# Patient Record
Sex: MALE | Race: WHITE | NOT HISPANIC OR LATINO | Employment: FULL TIME | URBAN - METROPOLITAN AREA
[De-identification: names, ages, dates, MRNs, and addresses within clinical notes are randomized per-mention and may not be internally consistent; named-entity substitution may affect disease eponyms.]

---

## 2017-01-04 ENCOUNTER — RADIANT APPOINTMENT (OUTPATIENT)
Dept: GENERAL RADIOLOGY | Facility: CLINIC | Age: 47
End: 2017-01-04
Attending: FAMILY MEDICINE
Payer: COMMERCIAL

## 2017-01-04 ENCOUNTER — OFFICE VISIT (OUTPATIENT)
Dept: FAMILY MEDICINE | Facility: CLINIC | Age: 47
End: 2017-01-04
Payer: COMMERCIAL

## 2017-01-04 VITALS
HEIGHT: 67 IN | TEMPERATURE: 96.8 F | BODY MASS INDEX: 27.31 KG/M2 | HEART RATE: 76 BPM | DIASTOLIC BLOOD PRESSURE: 72 MMHG | WEIGHT: 174 LBS | SYSTOLIC BLOOD PRESSURE: 98 MMHG

## 2017-01-04 DIAGNOSIS — M25.531 RIGHT WRIST PAIN: Primary | ICD-10-CM

## 2017-01-04 DIAGNOSIS — S63.501A RIGHT WRIST SPRAIN, INITIAL ENCOUNTER: ICD-10-CM

## 2017-01-04 DIAGNOSIS — M25.531 RIGHT WRIST PAIN: ICD-10-CM

## 2017-01-04 PROCEDURE — 99213 OFFICE O/P EST LOW 20 MIN: CPT | Performed by: FAMILY MEDICINE

## 2017-01-04 PROCEDURE — 73110 X-RAY EXAM OF WRIST: CPT | Mod: RT

## 2017-01-04 NOTE — NURSING NOTE
"Chief Complaint   Patient presents with     Wrist Pain     Fell on the ice 1/2/17 and hurt right wrist.      Initial BP 98/72 mmHg  Pulse 76  Temp(Src) 96.8  F (36  C) (Tympanic)  Ht 5' 7\" (1.702 m)  Wt 174 lb (78.926 kg)  BMI 27.25 kg/m2 Estimated body mass index is 27.25 kg/(m^2) as calculated from the following:    Height as of this encounter: 5' 7\" (1.702 m).    Weight as of this encounter: 174 lb (78.926 kg).  BP completed using cuff size: regular - right arm.  Mary Alcocer CMA  "

## 2017-01-04 NOTE — PROGRESS NOTES
"SUBJECTIVE:                                                    Jon Rowley is a 46 year old male who presents to clinic today for the following health issues:    Chief Complaint   Patient presents with     Wrist Pain     Fell on the ice 1/2/17 and hurt right wrist.      Joint Pain     Onset: 2 days ago     Description:   Location: right wrist  Character: Sharp, Dull ache, Stabbing and Burning. Decreased range of motion    Intensity: mild, severe    Progression of Symptoms: same    Accompanying Signs & Symptoms:  Other symptoms: none   History:   Previous similar pain: no       Precipitating factors:   Trauma or overuse: YES    Alleviating factors:  Improved by: nothing       Therapies Tried and outcome: ice, advil      Problem list and histories reviewed & adjusted, as indicated.  Additional history: as above   Out ice fishing slipped so fast does not remember how he hit it   Hurts dorso lateral.   Was able to move it has not gotten a lot better     Patient Active Problem List   Diagnosis     CARDIOVASCULAR SCREENING; LDL GOAL LESS THAN 160     Past Surgical History   Procedure Laterality Date     None       Colonoscopy  3/2/2011     COLONOSCOPY performed by ADRIANNE ROMAN at [facility]       Social History   Substance Use Topics     Smoking status: Never Smoker      Smokeless tobacco: Never Used     Alcohol Use: 0.0 oz/week     0 Standard drinks or equivalent per week      Comment: Occasional     Family History   Problem Relation Age of Onset     Cancer - colorectal Father      Circulatory Father      Mini strokes     Respiratory Maternal Grandmother      Emphysema     HEART DISEASE Maternal Grandfather      Heart attack           ROS:  Constitutional, HEENT, cardiovascular, pulmonary, gi and gu systems are negative, except as otherwise noted.    OBJECTIVE:                                                    BP 98/72 mmHg  Pulse 76  Temp(Src) 96.8  F (36  C) (Tympanic)  Ht 5' 7\" (1.702 m)  Wt 174 lb (78.926 kg) "  BMI 27.25 kg/m2 Body mass index is 27.25 kg/(m^2).   GENERAL: healthy, alert and no distress  MS: peripheral pulses normal  Wrist Exam: WRIST:  Inspection: swelling dorsally   no effusion  Palpation: Tender: Tenderness:, back of wrist   Non-tender: distal radius, distal ulna, scaphoid, lunate, triquetrum, hook of hamate, carpals, snuff box  Range of Motion: flexion:  decreased, painful, painful, extension:  decreased, painful, painful, Pronation/Supination: decreasedand painful for both   Strength: no deficits but painful   Special tests: radial, ulnar and median nerve function intact    ELBOW:  elbow exam : Inspection: no swelling  Tender: none  Non-tender: all  Range of Motion: all normal  Strength: elbow strength full  Special tests: normal stability:       NEURO: strength and tone- normal, sensory exam- grossly normal, mentation- intact, speech- normal,       xrays reviewed no fracture or dislocation by my read and reviewed with patient    ASSESSMENT/PLAN:                                                    (M25.53) Right wrist pain  (primary encounter diagnosis)  Comment:   Plan: XR Wrist Right G/E 3 Views            (S63.161A) Right wrist sprain, initial encounter  Comment:   Plan: patient is a  and requires paper work for [employer] expalining his injury. This was filled out and will be scanned in o the record.  Beverly el        reports that he has never smoked. He has never used smokeless tobacco.    Patient instructed to return to the office in 3 weeks  for reevaluation unless improving. Signs and symptoms of worsening are reviewed with the patient. If symptoms acutely change and condition worsens patient is instructed to seek medical evaluation through the office or urgent care department or if during non business hours through the emergency department.        Lisa Recinos M.D.    [facility]

## 2017-01-04 NOTE — PATIENT INSTRUCTIONS
3m futuro splint   Let me know if things are getting worse instead of better                   Wrist Sprain            What is a wrist sprain?   A sprain is an injury to a joint that causes a stretch or tear in a ligament. Ligaments are strong bands of tissue that connect one bone to another. Your wrist is made up of 8 bones that are attached to your hand bones and the bones of your forearm. The wrist joint is covered by a joint capsule and the bones are connected by ligaments.   How does it occur?   A wrist sprain can happen when you fall on your wrist or hand, when you are struck by an object, or during a forced motion of the wrist.   What are the symptoms?   You have pain, swelling, and tenderness in your wrist.   How is it diagnosed?   Your healthcare provider will review your symptoms and examine your wrist. You may have an X-ray to be sure you have not broken any bones in your wrist.   How is it treated?   To treat this condition:   Put an ice pack, gel pack, or package of frozen vegetables, wrapped in a cloth on the wrist every 3 to 4 hours, for up to 20 minutes at a time.   Raise your wrist on a pillow when you sit or lie down.   Take an anti-inflammatory such as ibuprofen, or other medicine as directed by your provider. Nonsteroidal anti-inflammatory medicines (NSAIDs) may cause stomach bleeding and other problems. These risks increase with age. Read the label and take as directed. Unless recommended by your healthcare provider, do not take for more than 10 days.   Wear a splint or cast on your wrist as directed by your provider.   Follow your provider's instructions for doing exercises to help you recover.   Some serious wrist sprains that involve ligament tears may need surgery.   While you are recovering from your injury you will need to change your sport or activity to one that does not make your condition worse. For example, you may need to run instead of playing basketball.   How long will the effects  last?   The length of recovery depends on many factors such as your age and health, and if you have had a previous wrist injury. Recovery time also depends on the severity of the wrist sprain. Pain from a wrist sprain may last several weeks or longer. You need to stop doing the activities that cause pain until your wrist has improved. If you continue doing activities that cause pain, your symptoms will return and it will take longer to recover.   When can I return to my normal activities?   Everyone recovers from an injury at a different rate. Return to your activities depends on how soon your wrist recovers, not by how many days or weeks it has been since your injury has occurred. In general, the longer you have symptoms before you start treatment, the longer it will take to get better. The goal of rehabilitation is to return you to your normal activities as soon as is safely possible. If you return too soon you may worsen your injury.   You may return to your activities when the injured wrist can move normally without pain. Your injured wrist, hand, and forearm need to have the same strength as the uninjured side.   How can I prevent a wrist sprain?   A wrist sprain usually occurs during an accident that is not preventable. However, when you are doing activities such as rollerblading be sure to wear protective wrist guards.          Wrist Sprain Rehabilitation Exercises              You may do the stretching exercises when the sharp wrist pain goes away. You may do the strengthening exercises when stretching is nearly painless.   Stretching exercises   Wrist Range of Motion   0. Flexion: Gently bend your wrist forward. Hold for 5 seconds. Do 3 sets of 10.   0. Extension: Gently bend your wrist backward. Hold this position 5 seconds. Do 3 sets of 10.   0. Side to side: Gently move your wrist from side to side (a handshake motion). Hold for 5 seconds in each direction. Do 3 sets of 10.   Wrist stretch: Press the  back of the hand on your injured side with your other hand to help bend your wrist. Hold for 15 to 30 seconds. Next, stretch the hand back by pressing the fingers in a backward direction. Hold for 15 to 30 seconds. Keep the arm on your injured side straight during this exercise. Do 3 sets.   Wrist extension stretch: Stand at a table with your palms down, fingers flat, and elbows straight. Lean your body weight forward. Hold this position for 15 seconds. Repeat 3 times.   Wrist flexion stretch: Stand with the back of your hands on a table, palms facing up, fingers pointing toward your body, and elbows straight. Lean away from the table. Hold this position for 15 to 30 seconds. Repeat 3 times.   Forearm pronation and supination: Bend the elbow of your injured arm 90 degrees, keeping your elbow at your side. Turn your palm up and hold for 5 seconds. Then slowly turn your palm down and hold for 5 seconds. Make sure you keep your elbow at your side and bent 90 degrees while you do the exercise. Do 3 sets of 10. When this exercise becomes pain free, do it with some weight in your hand such   as a soup can or hammer handle.   Strengthening exercises   Wrist flexion: Hold a can or hammer handle in your hand with your palm facing up. Bend your wrist upward. Slowly lower the weight and return to the starting position. Do 3 sets of 10. Gradually increase the weight of the can or weight you are holding.   Wrist extension: Hold a soup can or hammer handle in your hand with your palm facing down. Slowly bend your wrist up. Slowly lower the weight down into the starting position. Do 3 sets of 10. Gradually increase the weight of the object you are holding.    strengthening: Squeeze a soft rubber ball and hold the squeeze for 5 seconds. Do 3 sets of 10.     Published by [employer].  This content is reviewed periodically and is subject to change as new health information becomes available. The information is intended to inform  and educate and is not a replacement for medical evaluation, advice, diagnosis or treatment by a healthcare professional.   Written by Aure Hussein, MS, PT, and Mckayla Leong PT, [facility], \[facility] [place] [facility]\"", for [employer].     ? 2010 [employer] and/or its affiliates. All Rights Reserved.   Copyright   [employer] 2011  Adult Health Advisor

## 2017-01-04 NOTE — MR AVS SNAPSHOT
After Visit Summary   1/4/2017    Jon Rowley    MRN: [id number]           Patient Information     Date Of Birth          1970        Visit Information        Provider Department      1/4/2017 9:00 AM Lisa Recinos MD [facility]        Today's Diagnoses     Right wrist pain    -  1       Care Instructions    3m futuro splint   Let me know if things are getting worse instead of better                   Wrist Sprain            What is a wrist sprain?   A sprain is an injury to a joint that causes a stretch or tear in a ligament. Ligaments are strong bands of tissue that connect one bone to another. Your wrist is made up of 8 bones that are attached to your hand bones and the bones of your forearm. The wrist joint is covered by a joint capsule and the bones are connected by ligaments.   How does it occur?   A wrist sprain can happen when you fall on your wrist or hand, when you are struck by an object, or during a forced motion of the wrist.   What are the symptoms?   You have pain, swelling, and tenderness in your wrist.   How is it diagnosed?   Your healthcare provider will review your symptoms and examine your wrist. You may have an X-ray to be sure you have not broken any bones in your wrist.   How is it treated?   To treat this condition:   Put an ice pack, gel pack, or package of frozen vegetables, wrapped in a cloth on the wrist every 3 to 4 hours, for up to 20 minutes at a time.   Raise your wrist on a pillow when you sit or lie down.   Take an anti-inflammatory such as ibuprofen, or other medicine as directed by your provider. Nonsteroidal anti-inflammatory medicines (NSAIDs) may cause stomach bleeding and other problems. These risks increase with age. Read the label and take as directed. Unless recommended by your healthcare provider, do not take for more than 10 days.   Wear a splint or cast on your wrist as directed by your provider.   Follow your provider's instructions  for doing exercises to help you recover.   Some serious wrist sprains that involve ligament tears may need surgery.   While you are recovering from your injury you will need to change your sport or activity to one that does not make your condition worse. For example, you may need to run instead of playing basketball.   How long will the effects last?   The length of recovery depends on many factors such as your age and health, and if you have had a previous wrist injury. Recovery time also depends on the severity of the wrist sprain. Pain from a wrist sprain may last several weeks or longer. You need to stop doing the activities that cause pain until your wrist has improved. If you continue doing activities that cause pain, your symptoms will return and it will take longer to recover.   When can I return to my normal activities?   Everyone recovers from an injury at a different rate. Return to your activities depends on how soon your wrist recovers, not by how many days or weeks it has been since your injury has occurred. In general, the longer you have symptoms before you start treatment, the longer it will take to get better. The goal of rehabilitation is to return you to your normal activities as soon as is safely possible. If you return too soon you may worsen your injury.   You may return to your activities when the injured wrist can move normally without pain. Your injured wrist, hand, and forearm need to have the same strength as the uninjured side.   How can I prevent a wrist sprain?   A wrist sprain usually occurs during an accident that is not preventable. However, when you are doing activities such as rollerblading be sure to wear protective wrist guards.          Wrist Sprain Rehabilitation Exercises              You may do the stretching exercises when the sharp wrist pain goes away. You may do the strengthening exercises when stretching is nearly painless.   Stretching exercises   Wrist Range of Motion    0. Flexion: Gently bend your wrist forward. Hold for 5 seconds. Do 3 sets of 10.   0. Extension: Gently bend your wrist backward. Hold this position 5 seconds. Do 3 sets of 10.   0. Side to side: Gently move your wrist from side to side (a handshake motion). Hold for 5 seconds in each direction. Do 3 sets of 10.   Wrist stretch: Press the back of the hand on your injured side with your other hand to help bend your wrist. Hold for 15 to 30 seconds. Next, stretch the hand back by pressing the fingers in a backward direction. Hold for 15 to 30 seconds. Keep the arm on your injured side straight during this exercise. Do 3 sets.   Wrist extension stretch: Stand at a table with your palms down, fingers flat, and elbows straight. Lean your body weight forward. Hold this position for 15 seconds. Repeat 3 times.   Wrist flexion stretch: Stand with the back of your hands on a table, palms facing up, fingers pointing toward your body, and elbows straight. Lean away from the table. Hold this position for 15 to 30 seconds. Repeat 3 times.   Forearm pronation and supination: Bend the elbow of your injured arm 90 degrees, keeping your elbow at your side. Turn your palm up and hold for 5 seconds. Then slowly turn your palm down and hold for 5 seconds. Make sure you keep your elbow at your side and bent 90 degrees while you do the exercise. Do 3 sets of 10. When this exercise becomes pain free, do it with some weight in your hand such   as a soup can or hammer handle.   Strengthening exercises   Wrist flexion: Hold a can or hammer handle in your hand with your palm facing up. Bend your wrist upward. Slowly lower the weight and return to the starting position. Do 3 sets of 10. Gradually increase the weight of the can or weight you are holding.   Wrist extension: Hold a soup can or hammer handle in your hand with your palm facing down. Slowly bend your wrist up. Slowly lower the weight down into the starting position. Do 3 sets of  "10. Gradually increase the weight of the object you are holding.    strengthening: Squeeze a soft rubber ball and hold the squeeze for 5 seconds. Do 3 sets of 10.     Published by [employer].  This content is reviewed periodically and is subject to change as new health information becomes available. The information is intended to inform and educate and is not a replacement for medical evaluation, advice, diagnosis or treatment by a healthcare professional.   Written by Aure Hussein, MS, PT, and Mckayla Leong PT, [facility], [facility], for [employer].     ? 2010 [facility] and/or its affiliates. All Rights Reserved.   Copyright   [employer] 2011  Adult Health Advisor                    Follow-ups after your visit        Who to contact     Normal or non-critical lab and imaging results will be communicated to you by [employer], letter or phone within 4 business days after the clinic has received the results. If you do not hear from us within 7 days, please contact the clinic through [employer] or phone. If you have a critical or abnormal lab result, we will notify you by phone as soon as possible.  Submit refill requests through [employer] or call your pharmacy and they will forward the refill request to us. Please allow 3 business days for your refill to be completed.          If you need to speak with a  for additional information , please call: [phone]             Additional Information About Your Visit        [employer] Information     [employer] lets you send messages to your doctor, view your test results, renew your prescriptions, schedule appointments and more. To sign up, go to [email or web address] . Click on \"Log in\" on the left side of the screen, which will take you to the Welcome page. Then click on \"Sign up Now\" on the right side of the page.     You will be asked to enter the access code listed below, as well as some personal information. Please follow the directions to create " "your username and password.     Your access code is: [email or web address]  Expires: 3/22/2017 11:18 AM     Your access code will  in 90 days. If you need help or a new code, please call your [facility] or [phone].        Care EveryWhere ID     This is your Care EveryWhere ID. This could be used by other organizations to access your [place] medical records  [id number]        Your Vitals Were     Pulse Temperature Height BMI (Body Mass Index)          76 96.8  F (36  C) (Tympanic) 5' 7\" (1.702 m) 27.25 kg/m2         Blood Pressure from Last 3 Encounters:   17 98/72   16 110/84   16 132/80    Weight from Last 3 Encounters:   17 174 lb (78.926 kg)   16 174 lb 6.4 oz (79.107 kg)   16 169 lb 12.8 oz (77.021 kg)               Primary Care Provider Office Phone #    [facility] [phone]       No address on file        Thank you!     Thank you for choosing [facility]  for your care. Our goal is always to provide you with excellent care. Hearing back from our patients is one way we can continue to improve our services. Please take a few minutes to complete the written survey that you may receive in the mail after your visit with us. Thank you!             Your Updated Medication List - Protect others around you: Learn how to safely use, store and throw away your medicines at www.disposemymeds.org.          This list is accurate as of: 17  9:57 AM.  Always use your most recent med list.                   Brand Name Dispense Instructions for use    Multi-vitamin Tabs tablet      Take 1 tablet by mouth daily       NO ACTIVE MEDICATIONS            "

## 2017-05-14 ENCOUNTER — ANESTHESIA EVENT (OUTPATIENT)
Dept: GASTROENTEROLOGY | Facility: CLINIC | Age: 47
End: 2017-05-14
Payer: COMMERCIAL

## 2017-05-14 NOTE — ANESTHESIA PREPROCEDURE EVALUATION
Anesthesia Evaluation     . Pt has had prior anesthetic. Type: General           ROS/MED HX    ENT/Pulmonary: Comment: History of bronchitis      Neurologic:  - neg neurologic ROS     Cardiovascular:  - neg cardiovascular ROS       METS/Exercise Tolerance:  >4 METS   Hematologic:  - neg hematologic  ROS       Musculoskeletal:  - neg musculoskeletal ROS       GI/Hepatic:  - neg GI/hepatic ROS      (-) liver disease   Renal/Genitourinary:  - ROS Renal section negative       Endo:  - neg endo ROS       Psychiatric:  - neg psychiatric ROS       Infectious Disease:  - neg infectious disease ROS       Malignancy:      - no malignancy   Other:    - neg other ROS                 Physical Exam  Normal systems: cardiovascular, pulmonary and dental    Airway   Mallampati: II    Dental     Cardiovascular       Pulmonary                     Anesthesia Plan      History & Physical Review  History and physical reviewed and following examination; no interval change.    ASA Status:  2 .        Plan for MAC Reason for MAC:  Deep or markedly invasive procedure (G8)         Postoperative Care      Consents  Anesthetic plan, risks, benefits and alternatives discussed with:  Patient..                          .

## 2017-05-16 ENCOUNTER — SURGERY (OUTPATIENT)
Age: 47
End: 2017-05-16

## 2017-05-16 ENCOUNTER — ANESTHESIA (OUTPATIENT)
Dept: GASTROENTEROLOGY | Facility: CLINIC | Age: 47
End: 2017-05-16
Payer: COMMERCIAL

## 2017-05-16 ENCOUNTER — HOSPITAL ENCOUNTER (OUTPATIENT)
Facility: CLINIC | Age: 47
Discharge: HOME | End: 2017-05-16
Attending: SURGERY | Admitting: SURGERY
Payer: COMMERCIAL

## 2017-05-16 VITALS
HEIGHT: 67 IN | SYSTOLIC BLOOD PRESSURE: 115 MMHG | RESPIRATION RATE: 16 BRPM | DIASTOLIC BLOOD PRESSURE: 82 MMHG | OXYGEN SATURATION: 98 % | BODY MASS INDEX: 26.68 KG/M2 | TEMPERATURE: 98.4 F | WEIGHT: 170 LBS

## 2017-05-16 LAB — COLONOSCOPY: NORMAL

## 2017-05-16 PROCEDURE — 25000125 ZZHC RX 250: Performed by: NURSE ANESTHETIST, CERTIFIED REGISTERED

## 2017-05-16 PROCEDURE — 25000125 ZZHC RX 250: Performed by: SURGERY

## 2017-05-16 PROCEDURE — G0105 COLORECTAL SCRN; HI RISK IND: HCPCS | Performed by: SURGERY

## 2017-05-16 PROCEDURE — 37000008 ZZH ANESTHESIA TECHNICAL FEE, 1ST 30 MIN: Performed by: SURGERY

## 2017-05-16 PROCEDURE — 45378 DIAGNOSTIC COLONOSCOPY: CPT | Performed by: SURGERY

## 2017-05-16 PROCEDURE — 25000128 H RX IP 250 OP 636: Performed by: SURGERY

## 2017-05-16 RX ORDER — GLYCOPYRROLATE 0.2 MG/ML
INJECTION, SOLUTION INTRAMUSCULAR; INTRAVENOUS PRN
Status: DISCONTINUED | OUTPATIENT
Start: 2017-05-16 | End: 2017-05-16

## 2017-05-16 RX ORDER — LIDOCAINE 40 MG/G
CREAM TOPICAL
Status: DISCONTINUED | OUTPATIENT
Start: 2017-05-16 | End: 2017-05-16 | Stop reason: HOSPADM

## 2017-05-16 RX ORDER — ONDANSETRON 2 MG/ML
4 INJECTION INTRAMUSCULAR; INTRAVENOUS
Status: DISCONTINUED | OUTPATIENT
Start: 2017-05-16 | End: 2017-05-16 | Stop reason: HOSPADM

## 2017-05-16 RX ORDER — SODIUM CHLORIDE, SODIUM LACTATE, POTASSIUM CHLORIDE, CALCIUM CHLORIDE 600; 310; 30; 20 MG/100ML; MG/100ML; MG/100ML; MG/100ML
INJECTION, SOLUTION INTRAVENOUS CONTINUOUS
Status: DISCONTINUED | OUTPATIENT
Start: 2017-05-16 | End: 2017-05-16 | Stop reason: HOSPADM

## 2017-05-16 RX ORDER — PROPOFOL 10 MG/ML
INJECTION, EMULSION INTRAVENOUS PRN
Status: DISCONTINUED | OUTPATIENT
Start: 2017-05-16 | End: 2017-05-16

## 2017-05-16 RX ORDER — PROPOFOL 10 MG/ML
INJECTION, EMULSION INTRAVENOUS CONTINUOUS PRN
Status: DISCONTINUED | OUTPATIENT
Start: 2017-05-16 | End: 2017-05-16

## 2017-05-16 RX ADMIN — GLYCOPYRROLATE 0.2 MG: 0.2 INJECTION, SOLUTION INTRAMUSCULAR; INTRAVENOUS at 10:43

## 2017-05-16 RX ADMIN — PROPOFOL 30 MG: 10 INJECTION, EMULSION INTRAVENOUS at 10:43

## 2017-05-16 RX ADMIN — PROPOFOL 200 MCG/KG/MIN: 10 INJECTION, EMULSION INTRAVENOUS at 10:40

## 2017-05-16 RX ADMIN — PROPOFOL 30 MG: 10 INJECTION, EMULSION INTRAVENOUS at 10:45

## 2017-05-16 RX ADMIN — PROPOFOL 30 MG: 10 INJECTION, EMULSION INTRAVENOUS at 10:42

## 2017-05-16 RX ADMIN — SODIUM CHLORIDE, POTASSIUM CHLORIDE, SODIUM LACTATE AND CALCIUM CHLORIDE: 600; 310; 30; 20 INJECTION, SOLUTION INTRAVENOUS at 09:45

## 2017-05-16 RX ADMIN — LIDOCAINE HYDROCHLORIDE 1 ML: 10 INJECTION, SOLUTION INFILTRATION; PERINEURAL at 09:45

## 2017-05-16 RX ADMIN — PROPOFOL 30 MG: 10 INJECTION, EMULSION INTRAVENOUS at 10:41

## 2017-05-16 RX ADMIN — PROPOFOL 30 MG: 10 INJECTION, EMULSION INTRAVENOUS at 10:44

## 2017-05-16 NOTE — BRIEF OP NOTE
[facility]   Brief Operative Note    Pre-operative diagnosis: screening   Post-operative diagnosis normal colon   Procedure: Procedure(s):  Colonoscopy - Wound Class: II-Clean Contaminated   Surgeon(s): Surgeon(s) and Role:     * Edinson Torres MD - Primary   Estimated blood loss: * No values recorded between 5/16/2017 12:00 AM and 5/16/2017 10:58 AM *    Specimens: * No specimens in log *   Findings: Normal colon

## 2017-05-16 NOTE — ANESTHESIA POSTPROCEDURE EVALUATION
Patient: Jon Rowley    Procedure(s):  Colonoscopy - Wound Class: II-Clean Contaminated    Diagnosis:screening  Diagnosis Additional Information: No value filed.    Anesthesia Type:  MAC    Note:  Anesthesia Post Evaluation    Patient location during evaluation: Phase 2  Patient participation: Able to fully participate in evaluation  Level of consciousness: awake  Pain management: adequate  Airway patency: patent  Cardiovascular status: acceptable  Respiratory status: acceptable  Hydration status: acceptable  PONV: none     Anesthetic complications: None          Last vitals:  Vitals:    05/16/17 0920   BP: 113/85   Resp: 18   Temp: 36.9  C (98.4  F)   SpO2: 98%         Electronically Signed By: Marvin Anderson CRNA, APRN CRNA  May 16, 2017  11:04 AM

## 2017-05-16 NOTE — ANESTHESIA CARE TRANSFER NOTE
Patient: Jon Rowley    Procedure(s):  Colonoscopy - Wound Class: II-Clean Contaminated    Diagnosis: screening  Diagnosis Additional Information: No value filed.    Anesthesia Type:   MAC     Note:    Patient transferred to:Phase II        Vitals: (Last set prior to Anesthesia Care Transfer)    CRNA VITALS  5/16/2017 1031 - 5/16/2017 1104      5/16/2017             Pulse: 80    Ht Rate: 78    SpO2: 99 %    EKG: NSR                Electronically Signed By: Marvin Anderson CRNA, APRN CRNA  May 16, 2017  11:04 AM

## 2017-05-16 NOTE — H&P
46 year old year old male here for colonoscopy for screening.    Patient Active Problem List   Diagnosis     CARDIOVASCULAR SCREENING; LDL GOAL LESS THAN 160       No past medical history on file.    Past Surgical History:   Procedure Laterality Date     COLONOSCOPY  3/2/2011    COLONOSCOPY performed by AD[facility] ROMAN at [facility]     none         [facility]    No current outpatient prescriptions on file.       No Known Allergies    Pt reports that he has never smoked. He has never used smokeless tobacco. He reports that he drinks alcohol. He reports that he does not use illicit drugs.    Exam:  There were no vitals taken for this visit.    Awake, Alert OX3  Lungs - CTA bilaterally  CV - RRR, no murmurs, distal pulses intact  Abd - soft, non-distended, non-tender, +BS  Extr - No cyanosis or edema    A/P 46 year old year old male in need of colonoscopy for screening. Risks, benefits, alternatives, and complications were discussed including the possibility of perforation and the patient agreed to proceed    Edinson Torres MD

## 2018-02-02 ENCOUNTER — TELEPHONE (OUTPATIENT)
Dept: LAB | Facility: CLINIC | Age: 48
End: 2018-02-02

## 2018-05-18 ENCOUNTER — OFFICE VISIT (OUTPATIENT)
Dept: LAB | Facility: SCHOOL | Age: 48
End: 2018-05-18
Payer: COMMERCIAL

## 2018-05-18 VITALS
OXYGEN SATURATION: 97 % | HEART RATE: 74 BPM | TEMPERATURE: 97.6 F | SYSTOLIC BLOOD PRESSURE: 126 MMHG | RESPIRATION RATE: 16 BRPM | WEIGHT: 178.2 LBS | DIASTOLIC BLOOD PRESSURE: 86 MMHG | HEIGHT: 67 IN | BODY MASS INDEX: 27.97 KG/M2

## 2018-05-18 DIAGNOSIS — Z00.00 WELLNESS EXAMINATION: Primary | ICD-10-CM

## 2018-05-18 PROCEDURE — 99213 OFFICE O/P EST LOW 20 MIN: CPT | Performed by: NURSE PRACTITIONER

## 2018-05-18 NOTE — PROGRESS NOTES
"  SUBJECTIVE:  CC: Jon Rowley is an 47 year old man who presents for Wellness Check at [facility].     Review of Healthy Lifestyle:    Do you get at least three servings of calcium containing foods daily (dairy, green leafy vegetables, etc.)? yes     Do you have a high-fiber diet? no     Amount of exercise or daily activities, outside of work: 3-4 day(s) per week for 60 min    Do you wear sunscreen on a regular basis? Yes      Are you taking your medications regularly not applicable    Have you had an eye exam in the past two years? yes    Do you see a dentist twice per year? yes    Do you have sleep apnea, excessive snoring or excessive daytime drowsiness? no    Do you use tobacco in any form? no       OBJECTIVE:    Vitals: /86 (BP Location: Right arm, Patient Position: Sitting, Cuff Size: Adult Large)  Pulse 74  Temp 97.6  F (36.4  C) (Tympanic)  Resp 16  Ht 5' 7\" (1.702 m)  Wt 178 lb 3.2 oz (80.8 kg)  SpO2 97%  BMI 27.91 kg/m2  BMI= Body mass index is 27.91 kg/(m^2).    HEARING: :  Testing not done; patient declined    VISION:  Testing not done; just had eye exam    Medication Reconciliation: complete    ASSESSMENT/PLAN: Counseled on healthy diet and exercise guidelines. Encouraged to have fasting physical with PCP.    COUNSELING:      reports that he has never smoked. He has quit using smokeless tobacco.    Estimated body mass index is 27.91 kg/(m^2) as calculated from the following:    Height as of this encounter: 5' 7\" (1.702 m).    Weight as of this encounter: 178 lb 3.2 oz (80.8 kg).   Weight management plan: Discussed healthy diet and exercise guidelines and patient will follow up in 12 months in clinic to re-evaluate.    Counseling Resources  [place]'s MyPlate  https://www.quitplan.com/    TOMMY Leigh [facility] SCHOOL PROVIDER  [facility]     "

## 2018-05-18 NOTE — MR AVS SNAPSHOT
After Visit Summary   5/18/2018    Jon Rowley    MRN: [id number]           Patient Information     Date Of Birth          1970        Visit Information        Provider Department      5/18/2018 10:30 AM Provider, [facility]        Care Instructions                    Jon Rowley has completed a Wellness Check at the [facility] on 5/18/2018.      ____________________________________________  [facility] PROVIDER                                                                               Wellness Visit Recommendations:      See your regular primary care health provider every year in order to help stay healthy.    Review health changes.     Review your medicines if your doctor has prescribed any.    Talk to your provider about how often to have your cholesterol checked.    If you are at risk for diabetes, you should have a diabetes test (fasting glucose).    Shots: Get a flu shot each year. Get a tetanus shot every 10 years.     Review with your primary care provider other immunizations that you may need based on your age and/or medical/surgical history    Nutrition:     Eat at least 5 servings of fruits and vegetables each day.    Eat whole-grain bread, whole-wheat pasta and brown rice instead of white grains and rice.    You can visit: http://www.mayoclinic.org/healthy-lifestyle/nutrition-and-healthy-eating/in-depth/mediterranean-diet/art-77636412  For some information on a healthy diet.       Preventive Care to be reviewed by your primary care provider:    Females:        Cervical Cancer Screening                          Breast Cancer Screening                          Colon Cancer Screening  Males:             Prostrate Cancer Screening                          Colon Cancer Screening      Lifestyle:    Exercise at least 150 minutes a week (30 minutes a day, 5 days of the week). This will help you control your weight and help prevent  "disease or manage disease.    Limit alcohol to one drink per day or less depending on your past medical history.    No smoking.     Wear sunscreen to prevent skin cancer.    See your dentist every six months for an exam and cleaning.    Today's Vital Signs:  /86 (BP Location: Right arm, Patient Position: Sitting, Cuff Size: Adult Large)  Pulse 74  Temp 97.6  F (36.4  C) (Tympanic)  Resp 16  Ht 5' 7\" (1.702 m)  Wt 178 lb 3.2 oz (80.8 kg)  SpO2 97%  BMI 27.91 kg/m2          Follow-ups after your visit        Who to contact     If you have questions or need follow up information about today's clinic visit or your schedule please contact [place] [address] directly at [phone].  Normal or non-critical lab and imaging results will be communicated to you by Qikhart, letter or phone within 4 business days after the clinic has received the results. If you do not hear from us within 7 days, please contact the clinic through Qikhart or phone. If you have a critical or abnormal lab result, we will notify you by phone as soon as possible.  Submit refill requests through [employer] or call your pharmacy and they will forward the refill request to us. Please allow 3 business days for your refill to be completed.          Additional Information About Your Visit        QikharCarbonFlow Information     [employer] lets you send messages to your doctor, view your test results, renew your prescriptions, schedule appointments and more. To sign up, go to [email or web address] . Click on \"Log in\" on the left side of the screen, which will take you to the Welcome page. Then click on \"Sign up Now\" on the right side of the page.     You will be asked to enter the access code listed below, as well as some personal information. Please follow the directions to create your username and password.     Your access code is: [email or web address]  Expires: 2018 10:33 AM     Your access code will  in 90 days. If you need help " "or a new code, please call your [facility] clinic or [phone].        Care EveryWhere ID     This is your Care EveryWhere ID. This could be used by other organizations to access your [facility] medical records  [id number]        Your Vitals Were     Pulse Temperature Respirations Height Pulse Oximetry BMI (Body Mass Index)    74 97.6  F (36.4  C) (Tympanic) 16 5' 7\" (1.702 m) 97% 27.91 kg/m2       Blood Pressure from Last 3 Encounters:   05/18/18 126/86   05/16/17 115/82   01/04/17 98/72    Weight from Last 3 Encounters:   05/18/18 178 lb 3.2 oz (80.8 kg)   05/16/17 170 lb (77.1 kg)   01/04/17 174 lb (78.9 kg)              Today, you had the following     No orders found for display       Primary Care Provider Office Phone # Fax #    Maria C Lauri Cortes [phone] [address] [address] [facility] [address]        Equal Access to Services     [facility]: Hadii aad ku hadasho Soomaali, waaxda luqadaha, qaybta kaalmada adeegyada, waxmirna [place] hayoumou abarca . So [facility] [phone].    ATENCIÓN: Si habla español, tiene a sow disposición servicios gratuitos de asistencia lingüística. Llame al [phone].    We comply with applicable federal civil rights laws and [place] laws. We do not discriminate on the basis of race, color, national origin, age, disability, sex, sexual orientation, or gender identity.            Thank you!     Thank you for choosing [place] [address]  for your care. Our goal is always to provide you with excellent care. Hearing back from our patients is one way we can continue to improve our services. Please take a few minutes to complete the written survey that you may receive in the mail after your visit with us. Thank you!             Your Updated Medication List - Protect others around you: Learn how to safely use, store and throw away your medicines at www.disposemymeds.org.          This list is accurate as of 5/18/18 10:33 AM.  Always " use your most recent med list.                   Brand Name Dispense Instructions for use Diagnosis    Multi-vitamin Tabs tablet      Take 1 tablet by mouth daily        NO ACTIVE MEDICATIONS

## 2018-05-18 NOTE — PATIENT INSTRUCTIONS
Jon Rowley has completed a Wellness Check at the [facility] on 5/18/2018.      ____________________________________________  [place] SCHOOL PROVIDER                                                                               Wellness Visit Recommendations:      See your regular primary care health provider every year in order to help stay healthy.    Review health changes.     Review your medicines if your doctor has prescribed any.    Talk to your provider about how often to have your cholesterol checked.    If you are at risk for diabetes, you should have a diabetes test (fasting glucose).    Shots: Get a flu shot each year. Get a tetanus shot every 10 years.     Review with your primary care provider other immunizations that you may need based on your age and/or medical/surgical history    Nutrition:     Eat at least 5 servings of fruits and vegetables each day.    Eat whole-grain bread, whole-wheat pasta and brown rice instead of white grains and rice.    You can visit: http://www.mayoinic.org/healthy-lifestyle/nutrition-and-healthy-eating/in-depth/mediterranean-diet/art-72864587  For some information on a healthy diet.       Preventive Care to be reviewed by your primary care provider:    Females:        Cervical Cancer Screening                          Breast Cancer Screening                          Colon Cancer Screening  Males:             Prostrate Cancer Screening                          Colon Cancer Screening      Lifestyle:    Exercise at least 150 minutes a week (30 minutes a day, 5 days of the week). This will help you control your weight and help prevent disease or manage disease.    Limit alcohol to one drink per day or less depending on your past medical history.    No smoking.     Wear sunscreen to prevent skin cancer.    See your dentist every six months for an exam and cleaning.    Today's Vital Signs:  /86 (BP Location: Right arm, Patient  "Position: Sitting, Cuff Size: Adult Large)  Pulse 74  Temp 97.6  F (36.4  C) (Tympanic)  Resp 16  Ht 5' 7\" (1.702 m)  Wt 178 lb 3.2 oz (80.8 kg)  SpO2 97%  BMI 27.91 kg/m2  "

## 2018-06-08 ENCOUNTER — TELEPHONE (OUTPATIENT)
Dept: FAMILY MEDICINE | Facility: CLINIC | Age: 48
End: 2018-06-08

## 2018-06-08 NOTE — TELEPHONE ENCOUNTER
Patient's wife reports that they were goofing around and he got some cake in his ear. Ear is now a little painful and they are wanting to know what they can do to get out the crumbs. He did put in some swimmers ear drops. Advised to use a warm wash cloth to help draw it out. Spoke with Ebonie. Adivsed to use a bulb syringe and try to flush it out with warm water.  Left message on answering machine to call the clinic RN.  Aimee Church RN

## 2018-06-08 NOTE — TELEPHONE ENCOUNTER
.Reason for Call:  Other call back    Detailed comments: Tara calling to speak with RN.  No reason given.  Please call and assess.  Thank you..Nohemi Orourke    Phone Number Patient can be reached at: Cell number on file:    Telephone Information:   Mobile [phone]       Best Time: any time    Can we leave a detailed message on this number? YES    Call taken on 6/8/2018 at 2:58 PM by Nohemi Orourke

## 2019-06-14 ENCOUNTER — TRANSFERRED RECORDS (OUTPATIENT)
Dept: HEALTH INFORMATION MANAGEMENT | Facility: CLINIC | Age: 49
End: 2019-06-14

## 2019-06-14 LAB
ALT SERPL-CCNC: 20 U/L (ref 9–46)
AST SERPL-CCNC: 21 U/L (ref 10–40)
CHOLEST SERPL-MCNC: 270 MG/DL
CREAT SERPL-MCNC: 0.96 MG/DL (ref 0.6–1.35)
GFR SERPL CREATININE-BSD FRML MDRD: 94 ML/MIN/1.73M2
GLUCOSE SERPL-MCNC: 99 MG/DL (ref 65–99)
HDLC SERPL-MCNC: 74 MG/DL
LDLC SERPL CALC-MCNC: 175 MG/DL
NONHDLC SERPL-MCNC: 196 MG/DL
POTASSIUM SERPL-SCNC: 4.1 MMOL/L (ref 3.5–5.3)
TRIGL SERPL-MCNC: 96 MG/DL
TSH SERPL-ACNC: 3.61 MIU/L (ref 0.4–4.5)

## 2019-06-18 ENCOUNTER — HOSPITAL PATHOLOGY (OUTPATIENT)
Dept: OTHER | Facility: CLINIC | Age: 49
End: 2019-06-18

## 2019-06-18 ENCOUNTER — TRANSFERRED RECORDS (OUTPATIENT)
Dept: HEALTH INFORMATION MANAGEMENT | Facility: CLINIC | Age: 49
End: 2019-06-18

## 2019-06-19 LAB — COPATH REPORT: NORMAL

## 2019-07-05 ENCOUNTER — OFFICE VISIT (OUTPATIENT)
Dept: FAMILY MEDICINE | Facility: CLINIC | Age: 49
End: 2019-07-05
Payer: COMMERCIAL

## 2019-07-05 ENCOUNTER — HOSPITAL ENCOUNTER (OUTPATIENT)
Dept: ULTRASOUND IMAGING | Facility: CLINIC | Age: 49
Discharge: HOME | End: 2019-07-05
Attending: OTOLARYNGOLOGY | Admitting: OTOLARYNGOLOGY
Payer: COMMERCIAL

## 2019-07-05 VITALS
BODY MASS INDEX: 28.2 KG/M2 | DIASTOLIC BLOOD PRESSURE: 88 MMHG | TEMPERATURE: 97.9 F | HEART RATE: 77 BPM | WEIGHT: 179.7 LBS | SYSTOLIC BLOOD PRESSURE: 124 MMHG | HEIGHT: 67 IN

## 2019-07-05 DIAGNOSIS — C73 PAPILLARY THYROID CARCINOMA (H): ICD-10-CM

## 2019-07-05 DIAGNOSIS — Z01.818 PREOP GENERAL PHYSICAL EXAM: Primary | ICD-10-CM

## 2019-07-05 DIAGNOSIS — C73 PRIMARY THYROID PAPILLARY CARCINOMA (H): ICD-10-CM

## 2019-07-05 LAB
ERYTHROCYTE [DISTWIDTH] IN BLOOD BY AUTOMATED COUNT: 12.4 % (ref 10–15)
HCT VFR BLD AUTO: 45.2 % (ref 40–53)
HGB BLD-MCNC: 15.5 G/DL (ref 13.3–17.7)
MCH RBC QN AUTO: 30.9 PG (ref 26.5–33)
MCHC RBC AUTO-ENTMCNC: 34.3 G/DL (ref 31.5–36.5)
MCV RBC AUTO: 90 FL (ref 78–100)
PLATELET # BLD AUTO: 270 10E9/L (ref 150–450)
RBC # BLD AUTO: 5.01 10E12/L (ref 4.4–5.9)
WBC # BLD AUTO: 6.3 10E9/L (ref 4–11)

## 2019-07-05 PROCEDURE — 85027 COMPLETE CBC AUTOMATED: CPT | Performed by: FAMILY MEDICINE

## 2019-07-05 PROCEDURE — 36415 COLL VENOUS BLD VENIPUNCTURE: CPT | Performed by: FAMILY MEDICINE

## 2019-07-05 PROCEDURE — 99214 OFFICE O/P EST MOD 30 MIN: CPT | Performed by: FAMILY MEDICINE

## 2019-07-05 PROCEDURE — 76536 US EXAM OF HEAD AND NECK: CPT

## 2019-07-05 ASSESSMENT — MIFFLIN-ST. JEOR: SCORE: 1653.74

## 2019-07-05 NOTE — PROGRESS NOTES
[facility]  [address] [facility] [place] [address]  [phone]  Dept: [phone]    PRE-OP EVALUATION:  Today's date: 2019    Bong Rowley (: 1970) presents for pre-operative evaluation assessment as requested by Dr. Castillo, Lennox Wyman MD .  He requires evaluation and anesthesia risk assessment prior to undergoing surgery/procedure for treatment of RIGHT THYROID papillary carcinoma.    Proposed Surgery/ Procedure: RIGHT THYROID LOBECTOMY  Date of Surgery/ Procedure: 19  Time of Surgery/ Procedure: [facility]  Hospital/Surgical Facility: [facility]   Fax number for surgical facility: [phone] or   Primary Physician: No Ref-Primary, Physician  Type of Anesthesia Anticipated: General    Patient has a Health Care Directive or Living Will:  NO    1. NO - Do you have a history of heart attack, stroke, stent, bypass or surgery on an artery in the head, neck, heart or legs?  2. NO - Do you ever have any pain or discomfort in your chest?  3. NO - Do you have a history of  Heart Failure?  4. NO - Are you troubled by shortness of breath when: walking on the level, up a slight hill or at night?  5. NO - Do you currently have a cold, bronchitis or other respiratory infection?  6. NO - Do you have a cough, shortness of breath or wheezing?  7. NO - Do you sometimes get pains in the calves of your legs when you walk?  8. NO - Do you or anyone in your family have previous history of blood clots?  9. NO - Do you or does anyone in your family have a serious bleeding problem such as prolonged bleeding following surgeries or cuts?  10. NO - Have you ever had problems with anemia or been told to take iron pills?  11. NO - Have you had any abnormal blood loss such as black, tarry or bloody stools, or abnormal vaginal bleeding?  12. NO - Have you ever had a blood transfusion?  13. NO - Have you or any of your relatives ever had problems with anesthesia?  14. NO - Do you have sleep apnea,  "excessive snoring or daytime drowsiness?  15. NO - Do you have any prosthetic heart valves?  16. NO - Do you have prosthetic joints?  17. NO - Is there any chance that you may be pregnant?      HPI:     HPI related to upcoming procedure: palpable thyroid nodule. FNA showed papillary carcinoma      See problem list for active medical problems.  Problems all longstanding and stable, except as noted/documented.  See ROS for pertinent symptoms related to these conditions.      MEDICAL HISTORY:     Patient Active Problem List    Diagnosis Date Noted     CARDIOVASCULAR SCREENING; LDL GOAL LESS THAN 160 10/31/2010     Priority: Medium      No past medical history on file.  Past Surgical History:   Procedure Laterality Date     COLONOSCOPY  3/2/2011    COLONOSCOPY performed by ADRIANNE ROMAN at [facility]     COLONOSCOPY N/A 5/16/2017    Procedure: COLONOSCOPY;  Colonoscopy;  Surgeon: Edinson Torres MD;  Location: [facility]     none       Current Outpatient Medications   Medication Sig Dispense Refill     multivitamin, therapeutic with minerals (MULTI-VITAMIN) TABS Take 1 tablet by mouth daily       NO ACTIVE MEDICATIONS        OTC products: no recent use of OTC ASA, NSAIDS or Steroids and no use of herbal medications or other supplements    No Known Allergies      Latex Allergy: NO    Social History     Tobacco Use     Smoking status: Never Smoker     Smokeless tobacco: Former User   Substance Use Topics     Alcohol use: Yes     Alcohol/week: 0.0 oz     Comment: Occasional     History   Drug Use No       REVIEW OF SYSTEMS:   Constitutional, neuro, ENT, endocrine, pulmonary, cardiac, gastrointestinal, genitourinary, musculoskeletal, integument and psychiatric systems are negative, except as otherwise noted.    EXAM:   /88   Pulse 77   Temp 97.9  F (36.6  C) (Tympanic)   Ht 1.702 m (5' 7\")   Wt 81.5 kg (179 lb 11.2 oz)   BMI 28.14 kg/m      GENERAL APPEARANCE: healthy, alert and no distress     EYES: EOMI,  PERRL    "  HENT: ear canals and TM's normal and nose and mouth without ulcers or lesions     NECK: no adenopathy, no asymmetry, masses, or scars and thyroid normal to palpation     RESP: lungs clear to auscultation - no rales, rhonchi or wheezes     CV: regular rates and rhythm, normal S1 S2, no S3 or S4 and no murmur, click or rub     ABDOMEN:  soft, nontender, no HSM or masses and bowel sounds normal     MS: extremities normal- no gross deformities noted, no evidence of inflammation in joints, FROM in all extremities.     NEURO: Normal strength and tone, sensory exam grossly normal, mentation intact and speech normal     PSYCH: mentation appears normal. and affect normal/bright     LYMPHATICS: No cervical adenopathy    DIAGNOSTICS:   EKG: Not indicated due to non-vascular surgery and low risk of event (age <65 and without cardiac risk factors)    Results for orders placed or performed in visit on 07/05/19   CBC with platelets   Result Value Ref Range    WBC 6.3 4.0 - 11.0 10e9/L    RBC Count 5.01 4.4 - 5.9 10e12/L    Hemoglobin 15.5 13.3 - 17.7 g/dL    Hematocrit 45.2 40.0 - 53.0 %    MCV 90 78 - 100 fl    MCH 30.9 26.5 - 33.0 pg    MCHC 34.3 31.5 - 36.5 g/dL    RDW 12.4 10.0 - 15.0 %    Platelet Count 270 150 - 450 10e9/L       IMPRESSION:   Reason for surgery/procedure: RIGHT THYROID papillary carcinoma.    Proposed Surgery/ Procedure: RIGHT THYROID LOBECTOMY    Diagnosis/reason for consult:   Pre op consultation  Papillary carcinoma thyroid    The proposed surgical procedure is considered INTERMEDIATE risk.    REVISED CARDIAC RISK INDEX  The patient has the following serious cardiovascular risks for perioperative complications such as (MI, PE, VFib and 3  AV Block):  No serious cardiac risks  INTERPRETATION: 0 risks: Class I (very low risk - 0.4% complication rate)    The patient has the following additional risks for perioperative complications:  No identified additional risks      RECOMMENDATIONS:     Hold all nsaids  and asa for 7days prior to surgery.    APPROVAL GIVEN to proceed with proposed procedure, without further diagnostic evaluation       Signed Electronically by: Maria C Cortes MD    Copy of this evaluation report is provided to requesting physician.    [place] Preop Guidelines    Revised Cardiac Risk Index

## 2019-07-28 ENCOUNTER — TRANSFERRED RECORDS (OUTPATIENT)
Dept: HEALTH INFORMATION MANAGEMENT | Facility: CLINIC | Age: 49
End: 2019-07-28

## 2019-07-29 ENCOUNTER — TRANSFERRED RECORDS (OUTPATIENT)
Dept: HEALTH INFORMATION MANAGEMENT | Facility: CLINIC | Age: 49
End: 2019-07-29

## 2019-08-09 ENCOUNTER — TELEPHONE (OUTPATIENT)
Dept: FAMILY MEDICINE | Facility: CLINIC | Age: 49
End: 2019-08-09

## 2019-08-09 NOTE — TELEPHONE ENCOUNTER
Reason for Call:  Other appointment    Detailed comments: Tara LEFT MESSAGE:  Bong had his Right thyroid removed recently and she was wondering if he needs to have his thyroid labs drawn.  If he should would his primary write orders or his surgeon? Please call and assess. Thank you..Nohemi Orourke    Phone Number Patient can be reached at: Cell number on file:    Telephone Information:   Mobile [phone]         Call taken on 8/9/2019 at 1:13 PM by Nohemi Orourke

## 2019-08-09 NOTE — TELEPHONE ENCOUNTER
Chart reviewed.  Surgery 7/23/16 Dr Lennox Castillo.  Unable to view discharge instructions or follow up recommendations.    Call placed to patient.  Voicemail message left.  Advised patient call surgeon for recommendation of recheck thyroid labs.  Phone number given for Dr Castillo.  Call back number given for clinic RN if questions.  Leonela Cabrera RN

## 2019-09-09 ENCOUNTER — TRANSFERRED RECORDS (OUTPATIENT)
Dept: HEALTH INFORMATION MANAGEMENT | Facility: CLINIC | Age: 49
End: 2019-09-09

## 2019-09-09 LAB
ALT SERPL-CCNC: 19 U/L (ref 9–46)
AST SERPL-CCNC: 21 U/L (ref 10–40)
CHOLEST SERPL-MCNC: 333 MG/DL
CREAT SERPL-MCNC: 0.99 MG/DL (ref 0.6–1.35)
GFR SERPL CREATININE-BSD FRML MDRD: 90 ML/MIN/1.73M2
GLUCOSE SERPL-MCNC: 108 MG/DL (ref 65–139)
HDLC SERPL-MCNC: 72 MG/DL
LDLC SERPL CALC-MCNC: 198 MG/DL
NONHDLC SERPL-MCNC: 261 MG/DL
POTASSIUM SERPL-SCNC: 4.6 MMOL/L (ref 3.5–5.3)
TRIGL SERPL-MCNC: 369 MG/DL
TSH SERPL-ACNC: 9.07 MIU/L (ref 0.4–4.5)

## 2019-09-26 ENCOUNTER — OFFICE VISIT (OUTPATIENT)
Dept: FAMILY MEDICINE | Facility: CLINIC | Age: 49
End: 2019-09-26
Payer: COMMERCIAL

## 2019-09-26 VITALS
HEIGHT: 67 IN | TEMPERATURE: 97 F | BODY MASS INDEX: 30.13 KG/M2 | SYSTOLIC BLOOD PRESSURE: 122 MMHG | OXYGEN SATURATION: 97 % | RESPIRATION RATE: 16 BRPM | WEIGHT: 192 LBS | DIASTOLIC BLOOD PRESSURE: 80 MMHG | HEART RATE: 70 BPM

## 2019-09-26 DIAGNOSIS — R63.5 WEIGHT GAIN: ICD-10-CM

## 2019-09-26 DIAGNOSIS — E89.0 POSTSURGICAL HYPOTHYROIDISM: ICD-10-CM

## 2019-09-26 DIAGNOSIS — E89.0 H/O PARTIAL THYROIDECTOMY: ICD-10-CM

## 2019-09-26 DIAGNOSIS — R53.83 OTHER FATIGUE: ICD-10-CM

## 2019-09-26 DIAGNOSIS — C73 PAPILLARY THYROID CARCINOMA (H): Primary | ICD-10-CM

## 2019-09-26 PROCEDURE — 99214 OFFICE O/P EST MOD 30 MIN: CPT | Performed by: FAMILY MEDICINE

## 2019-09-26 RX ORDER — LEVOTHYROXINE SODIUM 50 UG/1
50 TABLET ORAL DAILY
COMMUNITY
End: 2020-01-23

## 2019-09-26 ASSESSMENT — PAIN SCALES - GENERAL: PAINLEVEL: NO PAIN (0)

## 2019-09-26 ASSESSMENT — MIFFLIN-ST. JEOR: SCORE: 1704.54

## 2019-09-26 NOTE — PROGRESS NOTES
"Subjective     Bong Rowley is a 49 year old male who presents to clinic today for the following health issues:    HPI   Chief Complaint   Patient presents with     Forms     disability forms     Recheck Medication     thyroid medication     Needs disability paperwork completed for diagnosis of papillary carcinoma of thyroid diagnosed 6/2019  Is a    Has an [employer] ME who said he should be off for the rest of the year     He had partial thyroidectomy for papillary thyroid carcinoma      Weight gain since thyroidectomy - about 30 pounds   Extreme fatigue - hard to stay awake, very tired  Just started on 50mcg levothyroxine this week  Seeing endo outside of [place]  - Dr Cheng.   Wants to switch to [place] endo     Sur[facility] at [facility]     Review of systems:  No constipation  No depression  No dry skin        Reviewed and updated as needed this visit by Provider       Patient Active Problem List   Diagnosis     CARDIOVASCULAR SCREENING; LDL GOAL LESS THAN 160     Papillary thyroid carcinoma (H)     H/O partial thyroidectomy     Postsurgical hypothyroidism     Current Outpatient Medications   Medication     levothyroxine (SYNTHROID/LEVOTHROID) 50 MCG tablet     multivitamin, therapeutic with minerals (MULTI-VITAMIN) TABS     NO ACTIVE MEDICATIONS     No current facility-administered medications for this visit.       No Known Allergies        Objective    /80 (BP Location: Right arm, Patient Position: Sitting, Cuff Size: Adult Large)   Pulse 70   Temp 97  F (36.1  C) (Tympanic)   Resp 16   Ht 1.702 m (5' 7\")   Wt 87.1 kg (192 lb)   SpO2 97%   BMI 30.07 kg/m    Body mass index is 30.07 kg/m .  GENERAL - Pt is alert and oriented in no acute distress.  Affect is appropriate. Good eye contact.      Assessment/Plan -  (C73) Papillary thyroid carcinoma (H)  (primary encounter diagnosis)  Comment: We worked through his paperwork, completing it with a vague return to work date of jan 1, 2020.  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 Currently on 50 of levothyroxine     TT =  25 minutes, more than half of the time was spent discussing plan, reviewing records, completing paperwork, and coordinating care.      ADOLFO Cortes MD

## 2019-11-06 ENCOUNTER — HEALTH MAINTENANCE LETTER (OUTPATIENT)
Age: 49
End: 2019-11-06

## 2019-11-18 ENCOUNTER — OFFICE VISIT (OUTPATIENT)
Dept: ENDOCRINOLOGY | Facility: CLINIC | Age: 49
End: 2019-11-18
Payer: COMMERCIAL

## 2019-11-18 VITALS
SYSTOLIC BLOOD PRESSURE: 132 MMHG | WEIGHT: 199.8 LBS | BODY MASS INDEX: 31.29 KG/M2 | DIASTOLIC BLOOD PRESSURE: 93 MMHG | HEART RATE: 72 BPM | OXYGEN SATURATION: 96 %

## 2019-11-18 DIAGNOSIS — C73 PAPILLARY THYROID CARCINOMA (H): Primary | ICD-10-CM

## 2019-11-18 DIAGNOSIS — E89.0 POSTSURGICAL HYPOTHYROIDISM: Primary | ICD-10-CM

## 2019-11-18 DIAGNOSIS — R03.0 ELEVATED BLOOD PRESSURE READING WITHOUT DIAGNOSIS OF HYPERTENSION: ICD-10-CM

## 2019-11-18 DIAGNOSIS — E89.0 POSTSURGICAL HYPOTHYROIDISM: ICD-10-CM

## 2019-11-18 LAB
T4 FREE SERPL-MCNC: 1.02 NG/DL (ref 0.76–1.46)
TSH SERPL DL<=0.005 MIU/L-ACNC: 4.47 MU/L (ref 0.4–4)

## 2019-11-18 PROCEDURE — 99205 OFFICE O/P NEW HI 60 MIN: CPT | Performed by: INTERNAL MEDICINE

## 2019-11-18 PROCEDURE — 84443 ASSAY THYROID STIM HORMONE: CPT | Performed by: INTERNAL MEDICINE

## 2019-11-18 PROCEDURE — 84439 ASSAY OF FREE THYROXINE: CPT | Performed by: INTERNAL MEDICINE

## 2019-11-18 PROCEDURE — 36415 COLL VENOUS BLD VENIPUNCTURE: CPT | Performed by: INTERNAL MEDICINE

## 2019-11-18 RX ORDER — LEVOTHYROXINE SODIUM 75 UG/1
75 TABLET ORAL DAILY
Qty: 30 TABLET | Refills: 11 | Status: SHIPPED | OUTPATIENT
Start: 2019-11-18 | End: 2020-01-23

## 2019-11-18 NOTE — PATIENT INSTRUCTIONS
-Labs today.     -Schedule ultrasound.   Call [facility] scheduling for your procedure:  For scheduling in the North ([place], [facility], and [place]) call [phone] or [phone]      For scheduling at ealth ([facility], [facility], [place]), call [phone] or [phone]      For scheduling in the South ([facility]) call [phone]  or  [phone]

## 2019-11-18 NOTE — LETTER
11/18/2019         RE: Bong Rowley  [address] [place] [address]        Dear Colleague,    Thank you for referring your patient, Bong Rowley, to the [facility]. Please see a copy of my visit note below.    CC: Papillary thyroid cancer.     HPI: Patient presents for evaluation of papillary thyroid cancer.   At his yearly exam in 5/2019, his MD noticed a nodule.       US 6/14/19:      He underwent FNA of the right nodule which was positive for PTC.     US neck 7/5/19:  FINDINGS: On the right and elongated level 2 lymph node measures 0.4 x  1.4 cm. No discrete fatty hilum is seen.     On the left an elongated level 2 lymph node is seen and measures 0.5 x  1.5 cm. It has a small fatty hilum.                                                                      IMPRESSION:  A single subcentimeter in short axis dimension Level 2  lymph node is seen in each side of the neck.    Surgery on 7/23/19:  SPECIMEN   Procedure:    Right lobectomy     TUMOR   Histologic Type:    Papillary carcinoma, classic (usual, conventional)    Tumor Size in Centimeters (cm):    Greatest dimension in Centimeters (cm): 4.6 Centimeters (cm)   Tumor Site:    Right lobe    Tumor Focality:    Unifocal    Tumor Extent:         Extrathyroidal Extension:    Not identified    Accessory Findings:         Angioinvasion (vascular invasion):    Not identified      Lymphatic Invasion:    Not identified      Perineural Invasion:    Not identified     MARGINS   Margins:    Uninvolved by carcinoma      Distance of Invasive Carcinoma from Closest Margin in Millimeters (mm):    1 Millimeters (mm)    LYMPH NODES   Number of Lymph Nodes Involved:    0    Number of Lymph Nodes Examined:    1      Hiro Levels:    Level VI - pretracheal, paratracheal and prelaryngeal / Delphian, perithyroidal (central compartment dissection)     PATHOLOGIC STAGE CLASSIFICATION (pTNM, AJCC 8th Edition)   :         Primary Tumor (pT):    pT3a    Regional Lymph  "Nodes (pN):    pN0a     He has been placed on levothyroxine 50 mcg daily.   This started in 9/2019.     ROS: 10 point ROS neg other than the symptoms noted above in the HPI.    PMH:   Hypothyroidism   PTC    Meds:  Current Outpatient Medications   Medication     levothyroxine (SYNTHROID/LEVOTHROID) 50 MCG tablet     multivitamin, therapeutic with minerals (MULTI-VITAMIN) TABS     No current facility-administered medications for this visit.      FHX:   No thyroid disease.     SHX:  Works as a  for [employer].   Non-smoker.     Exam:   Vital signs:      BP: (!) 132/93 Pulse: 72     SpO2: 96 %       Weight: 90.6 kg (199 lb 12.8 oz)  Estimated body mass index is 31.29 kg/m  as calculated from the following:    Height as of 9/26/19: 1.702 m (5' 7\").    Weight as of this encounter: 90.6 kg (199 lb 12.8 oz).  Gen: In NAD.   HEENT: no proptosis or lid lag, EOMI, no palpable thyroid tissue. No LAD.   Card: S1 S2 RRR no m/r/g. no LE edema.   Pulm: CTA b/l.   GI: NT ND +BS.   MSK: no gross deformities.   Derm: no rashes or lesions.   Neuro: no tremor, +2 DTR's.     A/P:   Papillary thyroid cancer - Outside records reviewed. Right lobectomy 7/23/19. pT3a pN0a. We discussed the thyroid guidelines, lobectomy for nodules <4 cm and total thyroidectomy for nodules >4 cm. His pre-op ultrasound did not show any nodules on the left. We discussed observation versus completion thyroidectomy. Single LN seen pre-op on the left had a fatty zac so no pre-op evidence of disease on the left.   -Schedule ultrasound.   Call [facility] scheduling for your procedure:  For scheduling in the [place] ([facility], and [facility]) call [phone] or [phone]      For scheduling at [facility] ([facility], [facility]), call [phone] or [phone]      For scheduling in the South ([facility], [facility]) call [phone]  or  [phone]  "     Hypothyroidism - post-op. TSH elevated in 9/2019. Still having fatigue today.   -Lab today     Elevated Blood pressure - repeat 137/100, 130/91, 129/88. Prior normal readings. Will follow.       Marvin Cannon MD on 11/18/2019 at 8:39 AM          Again, thank you for allowing me to participate in the care of your patient.        Sincerely,        Marvin Cannon MD

## 2019-11-18 NOTE — PROGRESS NOTES
CC: Papillary thyroid cancer.     HPI: Patient presents for evaluation of papillary thyroid cancer.   At his yearly exam in 5/2019, his MD noticed a nodule.       US 6/14/19:      He underwent FNA of the right nodule which was positive for PTC.     US neck 7/5/19:  FINDINGS: On the right and elongated level 2 lymph node measures 0.4 x  1.4 cm. No discrete fatty hilum is seen.     On the left an elongated level 2 lymph node is seen and measures 0.5 x  1.5 cm. It has a small fatty hilum.                                                                      IMPRESSION:  A single subcentimeter in short axis dimension Level 2  lymph node is seen in each side of the neck.    Surgery on 7/23/19:  SPECIMEN   Procedure:    Right lobectomy     TUMOR   Histologic Type:    Papillary carcinoma, classic (usual, conventional)    Tumor Size in Centimeters (cm):    Greatest dimension in Centimeters (cm): 4.6 Centimeters (cm)   Tumor Site:    Right lobe    Tumor Focality:    Unifocal    Tumor Extent:         Extrathyroidal Extension:    Not identified    Accessory Findings:         Angioinvasion (vascular invasion):    Not identified      Lymphatic Invasion:    Not identified      Perineural Invasion:    Not identified     MARGINS   Margins:    Uninvolved by carcinoma      Distance of Invasive Carcinoma from Closest Margin in Millimeters (mm):    1 Millimeters (mm)    LYMPH NODES   Number of Lymph Nodes Involved:    0    Number of Lymph Nodes Examined:    1      Hiro Levels:    Level VI - pretracheal, paratracheal and prelaryngeal / Delphian, perithyroidal (central compartment dissection)     PATHOLOGIC STAGE CLASSIFICATION (pTNM, AJCC 8th Edition)   :         Primary Tumor (pT):    pT3a    Regional Lymph Nodes (pN):    pN0a     He has been placed on levothyroxine 50 mcg daily.   This started in 9/2019.     ROS: 10 point ROS neg other than the symptoms noted above in the HPI.    PMH:   Hypothyroidism   PTC    Meds:  Current  "Outpatient Medications   Medication     levothyroxine (SYNTHROID/LEVOTHROID) 50 MCG tablet     multivitamin, therapeutic with minerals (MULTI-VITAMIN) TABS     No current facility-administered medications for this visit.      FHX:   No thyroid disease.     SHX:  Works as a  for [employer].   Non-smoker.     Exam:   Vital signs:      BP: (!) 132/93 Pulse: 72     SpO2: 96 %       Weight: 90.6 kg (199 lb 12.8 oz)  Estimated body mass index is 31.29 kg/m  as calculated from the following:    Height as of 9/26/19: 1.702 m (5' 7\").    Weight as of this encounter: 90.6 kg (199 lb 12.8 oz).  Gen: In NAD.   HEENT: no proptosis or lid lag, EOMI, no palpable thyroid tissue. No LAD.   Card: S1 S2 RRR no m/r/g. no LE edema.   Pulm: CTA b/l.   GI: NT ND +BS.   MSK: no gross deformities.   Derm: no rashes or lesions.   Neuro: no tremor, +2 DTR's.     A/P:   Papillary thyroid cancer - Outside records reviewed. Right lobectomy 7/23/19. pT3a pN0a. We discussed the thyroid guidelines, lobectomy for nodules <4 cm and total thyroidectomy for nodules >4 cm. His pre-op ultrasound did not show any nodules on the left. We discussed observation versus completion thyroidectomy. Single LN seen pre-op on the left had a fatty zac so no pre-op evidence of disease on the left.   -Schedule ultrasound.   Call [facility] scheduling for your procedure:  For scheduling in the [place] ([facility], [facility]) call [phone] or [phone]      For scheduling at [facility] ([facility], [facility]), call [phone] or [phone]      For scheduling in the [place] ([facility]) call [phone]  or  [phone]      Hypothyroidism - post-op. TSH elevated in 9/2019. Still having fatigue today.   -Lab today     Elevated Blood pressure - repeat 137/100, 130/91, 129/88. Prior normal readings. Will follow.       Marvin Cannon MD on " 11/18/2019 at 8:39 AM

## 2019-11-26 ENCOUNTER — HOSPITAL ENCOUNTER (OUTPATIENT)
Dept: ULTRASOUND IMAGING | Facility: CLINIC | Age: 49
End: 2019-11-26
Attending: INTERNAL MEDICINE
Payer: COMMERCIAL

## 2019-11-26 ENCOUNTER — HOSPITAL ENCOUNTER (OUTPATIENT)
Dept: ULTRASOUND IMAGING | Facility: CLINIC | Age: 49
Discharge: HOME | End: 2019-11-26
Attending: INTERNAL MEDICINE | Admitting: INTERNAL MEDICINE
Payer: COMMERCIAL

## 2019-11-26 DIAGNOSIS — E89.0 POSTSURGICAL HYPOTHYROIDISM: ICD-10-CM

## 2019-11-26 DIAGNOSIS — C73 PAPILLARY THYROID CARCINOMA (H): ICD-10-CM

## 2019-11-26 LAB — TSH SERPL DL<=0.005 MIU/L-ACNC: 2.96 MU/L (ref 0.4–4)

## 2019-11-26 PROCEDURE — 76536 US EXAM OF HEAD AND NECK: CPT | Mod: XU

## 2019-11-26 PROCEDURE — 76536 US EXAM OF HEAD AND NECK: CPT

## 2019-11-26 PROCEDURE — 36415 COLL VENOUS BLD VENIPUNCTURE: CPT | Performed by: INTERNAL MEDICINE

## 2019-11-26 PROCEDURE — 84443 ASSAY THYROID STIM HORMONE: CPT | Performed by: INTERNAL MEDICINE

## 2019-11-27 ENCOUNTER — TELEPHONE (OUTPATIENT)
Dept: ENDOCRINOLOGY | Facility: CLINIC | Age: 49
End: 2019-11-27

## 2019-11-27 NOTE — TELEPHONE ENCOUNTER
----- Message from Marvin Cannon MD sent at 11/26/2019  4:34 PM CST -----  Nodule on the left is stable.   Lymph nodes stable.   Lab in process.   Final plan once that is back.     Marvin Cannon MD on 11/26/2019 at 4:34 PM

## 2019-11-27 NOTE — TELEPHONE ENCOUNTER
Called and discussed results with patient.  Patient verbalized understanding and has no questions.    Rafael Recio RN....11/27/2019 10:53 AM

## 2019-12-02 DIAGNOSIS — E89.0 POSTSURGICAL HYPOTHYROIDISM: Primary | ICD-10-CM

## 2019-12-30 DIAGNOSIS — E89.0 POSTSURGICAL HYPOTHYROIDISM: Primary | ICD-10-CM

## 2019-12-30 DIAGNOSIS — E89.0 POSTSURGICAL HYPOTHYROIDISM: ICD-10-CM

## 2019-12-30 LAB — TSH SERPL DL<=0.005 MIU/L-ACNC: 3.46 MU/L (ref 0.4–4)

## 2019-12-30 PROCEDURE — 84443 ASSAY THYROID STIM HORMONE: CPT | Performed by: INTERNAL MEDICINE

## 2019-12-30 PROCEDURE — 36415 COLL VENOUS BLD VENIPUNCTURE: CPT | Performed by: INTERNAL MEDICINE

## 2019-12-31 DIAGNOSIS — E89.0 POSTSURGICAL HYPOTHYROIDISM: Primary | ICD-10-CM

## 2019-12-31 RX ORDER — LEVOTHYROXINE SODIUM 75 UG/1
TABLET ORAL
Qty: 36 TABLET | Refills: 1 | Status: SHIPPED | OUTPATIENT
Start: 2019-12-31 | End: 2020-03-04

## 2020-01-16 ENCOUNTER — TELEPHONE (OUTPATIENT)
Dept: FAMILY MEDICINE | Facility: CLINIC | Age: 50
End: 2020-01-16

## 2020-01-16 NOTE — TELEPHONE ENCOUNTER
Reason for Call:  Form, our goal is to have forms completed with 72 hours, however, some forms may require a visit or additional information.    Type of letter, form or note:  disability  Claim form - [employer]   Who is the form from?: patient (if other please explain)    Where did the form come from: Patient or family brought in       What clinic location was the form placed at?: [facility]     Where the form was placed: Dr. Cortes's Box/Folder    What number is listed as a contact on the form?: [phone]       Call taken on 1/16/2020 at 2:40 PM by Nohemi Orourke

## 2020-01-21 NOTE — TELEPHONE ENCOUNTER
"I saw him in sept and we discussed his disability:     \"Needs disability paperwork completed for diagnosis of papillary carcinoma of thyroid diagnosed 6/2019  Is a    Has an [employer] ME who said he should be off for the rest of the year      He had partial thyroidectomy for papillary thyroid carcinoma\"         Current paperwork appears to be for further disability time? I am unable to complete this withoutfurther info.  Is he planning to return to work this month?  Per our discussion in sept, he was off until the end of the year. If he is still off, why? And for what dates?    ADOLFO Cortes MD   "

## 2020-01-21 NOTE — TELEPHONE ENCOUNTER
"Patient reports that the form is due on 1/27/20 in order to receive another payment.   He said that he would like to go back to work 2/29/20.  His surgery was on 7/23/19.     He reports: \"My thyroid test was still off on 12/30/19 so they adjusted my levothyroxine. I take 75 mcg daily plus 2 days a week, I take another one. I plan to have my thyroid levels checked again in 2 weeks.    He said that he is doing \"a lot better\" since levothyroxine adjustment on 12/31/19. \"I feel almost back to normal.\" He said he had gained 30 pounds in the weeks  post surgery; was up to 201. He said that he has been exercising and is down to 180 pounds.    Antwan Parker RN        "

## 2020-01-22 NOTE — TELEPHONE ENCOUNTER
Component      Latest Ref Rng & Units 11/18/2019 11/26/2019 12/30/2019   TSH      0.40 - 4.00 mU/L 4.47 (H) 2.96 3.46       His surgery was in July.   His TSH is normal.   I feel he is ready to return to work.   I will reach out to Dr Cannon to see if he agrees.     ADOLFO Cortes MD

## 2020-01-23 ENCOUNTER — OFFICE VISIT (OUTPATIENT)
Dept: FAMILY MEDICINE | Facility: CLINIC | Age: 50
End: 2020-01-23
Payer: COMMERCIAL

## 2020-01-23 VITALS
SYSTOLIC BLOOD PRESSURE: 145 MMHG | HEIGHT: 67 IN | RESPIRATION RATE: 16 BRPM | HEART RATE: 90 BPM | BODY MASS INDEX: 30.48 KG/M2 | DIASTOLIC BLOOD PRESSURE: 95 MMHG | TEMPERATURE: 98.2 F | WEIGHT: 194.2 LBS

## 2020-01-23 DIAGNOSIS — E89.0 H/O PARTIAL THYROIDECTOMY: ICD-10-CM

## 2020-01-23 DIAGNOSIS — E89.0 POSTSURGICAL HYPOTHYROIDISM: Primary | ICD-10-CM

## 2020-01-23 DIAGNOSIS — C73 PAPILLARY THYROID CARCINOMA (H): ICD-10-CM

## 2020-01-23 PROCEDURE — 99213 OFFICE O/P EST LOW 20 MIN: CPT | Performed by: FAMILY MEDICINE

## 2020-01-23 ASSESSMENT — MIFFLIN-ST. JEOR: SCORE: 1714.52

## 2020-01-23 NOTE — PROGRESS NOTES
"SUBJECTIVE:                                                    Bong Rowley is a 49 year old male who presents to clinic today for the following health issues:    Patient is here today to go over some disability forms  He is a [employer]    Has been off work post thyroid cancer, thyroidectomy, and working on postoperative hypothyroidism.    His thyroid med was recently increased due to fatigue and need for daily naps with TSH at 3.46  He is currently taking 75mcg for 5 days of the week and 150 for 2 days of the week.     He is feeling better overall.   He will come for a blood test next week and then hopes to return to work     He has disability paperwork and says HR told him to put the last day in feb as his return to work day.     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  Additional history: as documented   Patient Active Problem List   Diagnosis     CARDIOVASCULAR SCREENING; LDL GOAL LESS THAN 160     Papillary thyroid carcinoma (H)     H/O partial thyroidectomy     Postsurgical hypothyroidism     Current Outpatient Medications   Medication     levothyroxine (SYNTHROID/LEVOTHROID) 75 MCG tablet     multivitamin, therapeutic with minerals (MULTI-VITAMIN) TABS     No current facility-administered medications for this visit.       No Known Allergies      OBJECTIVE:                                                    BP (!) 145/95   Pulse 90   Temp 98.2  F (36.8  C) (Tympanic)   Resp 16   Ht 1.702 m (5' 7\")   Wt 88.1 kg (194 lb 3.2 oz)   BMI 30.42 kg/m   Body mass index is 30.42 kg/m .   GENERAL - Pt is alert and oriented in no acute distress.  Affect " is appropriate. Good eye contact.  RESPIRATORY - Clear to auscultation bilaterally.  No wheezing noted  CV - RRR, no murmurs, rubs, gallops.          ASSESSMENT/PLAN:                                                      (E89.0) Postsurgical hypothyroidism  (primary encounter diagnosis)  Comment: I wrote on his disability document 2/7/2020 - 2/29/2020 as return to work date estimated date range. I think he is ready as soon as his next lab result comes back. He agrees and feels it will likely be normal since he is feeling more energy.  The patient indicates understanding of these issues and agrees with the plan.  Plan:     (C73) Papillary thyroid carcinoma (H)  Comment: healed well. No chemo or radiation needed   Plan:     (Z90.09) H/O partial thyroidectomy  Comment:   Plan:     He'll return to clinic for blood pressure check in a couple weeks.     TT =  15 minutes, more than half of the time was spent discussing plan, reviewing records, and coordinating care.      ADOLFO Cortes MD     [facility]

## 2020-01-23 NOTE — TELEPHONE ENCOUNTER
Marvin Cannno MD Berg, Christine Gove, MD             I have not seen him in months and cannot think of why he would need any more time off.     Best,   Marvin Cannon MD on 1/22/2020 at 8:09 AM    Previous Messages      ----- Message -----   From: Maria C Cortes MD   Sent: 1/21/2020   6:29 PM CST   To: Marvin Cannon MD       Hi Jon Salas is asking for another 5 weeks off work for his recovery from papillary thyroid cancer.  I haven't seen him since last Fall but, after reviewing his chart, I feel like he is able to return to work now.  Do you see any reason he needs more time off work?     Thanks, Zulma Cortes MD                 Please call Bong. Both his endocrinologist and I feel he is ready to go back to work. He should make a clinic appointment to discuss further if he feels there are good reasons to continue being off work.    ADOLFO Cortes MD

## 2020-01-23 NOTE — TELEPHONE ENCOUNTER
Call placed to patient.  Relayed  Message per Dr Cortes.  Scheduled appointment for today.  Leonela Cabrera RN

## 2020-02-04 ENCOUNTER — TELEPHONE (OUTPATIENT)
Dept: FAMILY MEDICINE | Facility: CLINIC | Age: 50
End: 2020-02-04

## 2020-02-04 NOTE — LETTER
[facility]  [address] [address]  Phone: [phone]      February 4, 2020      RE: Bong Rowley  [address]  [place] [address]        To whom it may concern:    Bong Rowley is under my professional care. The employee is ABLE to return to work.        Sincerely,        Dr. Maria C Carreon

## 2020-02-04 NOTE — TELEPHONE ENCOUNTER
Reason for Call:  Form, our goal is to have forms completed with 72 hours, however, some forms may require a visit or additional information.    Type of letter, form or note:  Letter to return to work    Who is the form from?: Letter (if other please explain)    Pt is calling and requested a letter for Dr. Cortez, [employer] MD that he is able to return to work.  Did not want to list a date.  He will be here tomorrow 2/5/2020 for a lab draw at 11:00 am  and if possible would like to pick that up at that time.    What number is listed as a contact on the form?:        Additional comments: none    Call taken on 2/4/2020 at 9:38 AM by Vaishali Ye

## 2020-02-05 DIAGNOSIS — E89.0 POSTSURGICAL HYPOTHYROIDISM: ICD-10-CM

## 2020-02-05 LAB — TSH SERPL DL<=0.005 MIU/L-ACNC: 1.94 MU/L (ref 0.4–4)

## 2020-02-05 PROCEDURE — 84443 ASSAY THYROID STIM HORMONE: CPT | Performed by: INTERNAL MEDICINE

## 2020-02-05 PROCEDURE — 36415 COLL VENOUS BLD VENIPUNCTURE: CPT | Performed by: INTERNAL MEDICINE

## 2020-02-06 DIAGNOSIS — C73 PAPILLARY THYROID CARCINOMA (H): Primary | ICD-10-CM

## 2020-02-06 NOTE — RESULT ENCOUNTER NOTE
Georgedanny Diallo stated she is having a lymph node removed. Surgery if needed is recommended in the 2nd trimester. Notify anesthesia she is pregnant. Her OB should also be notified and available for evaluation if any complications during the procedure.       I di Patient viewed results via [employer] on February 6, 2020.    Ying MELVIN MA

## 2020-03-04 ENCOUNTER — MYC MEDICAL ADVICE (OUTPATIENT)
Dept: ENDOCRINOLOGY | Facility: CLINIC | Age: 50
End: 2020-03-04

## 2020-03-04 DIAGNOSIS — E89.0 POSTSURGICAL HYPOTHYROIDISM: ICD-10-CM

## 2020-03-04 RX ORDER — LEVOTHYROXINE SODIUM 75 UG/1
TABLET ORAL
Qty: 108 TABLET | Refills: 0 | Status: SHIPPED | OUTPATIENT
Start: 2020-03-04 | End: 2020-05-27

## 2020-03-04 RX ORDER — LEVOTHYROXINE SODIUM 75 UG/1
TABLET ORAL
Qty: 36 TABLET | Refills: 1 | OUTPATIENT
Start: 2020-03-04

## 2020-03-04 NOTE — TELEPHONE ENCOUNTER
ERX sent for 90days he is due for follow up in 8/2020    Kelsy Sharp, RN Specialty Triage 3/4/2020 10:56 AM

## 2020-03-04 NOTE — TELEPHONE ENCOUNTER
levothyroxine (SYNTHROID/LEVOTHROID) 75 MCG tablet 108 tablet 0 3/4/2020  No   Sig: Take 1 tablet by mouth 5 days of the week and 2 tablets 2 days of the week.   Sent to pharmacy as: levothyroxine (SYNTHROID/LEVOTHROID) 75 MCG tablet   Class: E-Prescribe   Notes to Pharmacy: This is a 90day supply   Order: [id number]   E-Prescribing Status: Receipt confirmed by pharmacy (3/4/2020 10:57 AM CST)     Duplicate request.   Closing encounter.     Dottie De Anda RN

## 2020-03-18 ENCOUNTER — VIRTUAL VISIT (OUTPATIENT)
Dept: FAMILY MEDICINE | Facility: OTHER | Age: 50
End: 2020-03-18

## 2020-03-18 NOTE — PROGRESS NOTES
"Date: 2020 10:59:06  Clinician: Ketan Velasco  Clinician NPI: [id number]  Patient: Bong Rowley  Patient : 1970  Patient Address: [address] [place], [place] [id number]  Patient Phone: [phone]  Visit Protocol: URI  Patient Summary:  Bong is a 49 year old ( : 1970 ) male who initiated a Visit for cold, sinus infection, or influenza. When asked the question \"Please sign me up to receive news, health information and promotions from [employer].\", Bong responded \"No\".    Bong states his symptoms started gradually 2-3 weeks ago. After his symptoms started, they improved and then got worse again.   His symptoms consist of a cough, nasal congestion, malaise, and myalgia.   Symptom details     Nasal secretions: The color of his mucus is clear.    Cough: Bong coughs every 5-10 minutes and his cough is more bothersome at night. Phlegm does not come into his throat when he coughs. He does not believe his cough is caused by post-nasal drip.      Bong denies having wheezing, sore throat, fever, ear pain, headache, rhinitis, facial pain or pressure, chills, and teeth pain. He also denies taking antibiotic medication for the symptoms and having recent facial or sinus surgery in the past 60 days. He is not experiencing dyspnea.   Precipitating events  He has not recently been exposed to someone with influenza. Bong has not been in close contact with any high risk individuals.   Pertinent COVID-19 (Coronavirus) information  Bong has not traveled internationally or to the areas where COVID-19 (Coronavirus) is widespread in the last 14 days before the start of his symptoms.   Bong has not had a close contact with a laboratory-confirmed COVID-19 patient within 14 days of symptom onset. He also has not had a close contact with a suspected COVID-19 patient within 14 days of symptom onset.   Bong is not a healthcare worker and does not work in a healthcare facility.   Pertinent medical history  " Bong needs a return to work/school note.   Weight: 180 lbs   Bong does not smoke or use smokeless tobacco.   Additional information as reported by the patient (free text): I am fatigued and tired.  This all started with a sore throat the night of March 4.   Weight: 180 lbs    MEDICATIONS: levothyroxine (bulk), ALLERGIES: NKDA  Clinician Response:  Dear Bong,  Based on the information provided, you have a viral upper respiratory infection, otherwise known as a cold. Symptoms vary from person to person, but can include sneezing, coughing, a runny nose, sore throat, and headache and range from mild to severe.  Unfortunately, there are no medications that can cure a cold, so treatment is focused on controlling symptoms as much as possible. Most people gradually feel better until symptoms are gone in 1-2 weeks.  Medication information  Because you have a viral infection, antibiotics will not help you get better. Treating a viral infection with antibiotics could actually make you feel worse.  Unless you are allergic to the over-the-counter medication(s) below, I recommend using:       Acetaminophen (Tylenol or store brand) oral tablet. Take 1-2 tablets by mouth every 4-6 hours to help with the discomfort.      Ibuprofen (Advil or store brand) 200 mg oral tablet. Take 1-3 tablets (200-600 mg) by mouth every 8 hours to help with the discomfort. Make sure to take the ibuprofen with food. Do not exceed 2400 mg in 24 hours.    An antihistamine such as Benadryl, Claritin, or store brand.    A decongestant such as Sudafed PE or store brand.      Guaifenesin + dextromethorphan (Robitussin DM, Mucinex DM, or store brand).     Over-the-counter medications do not require a prescription. Ask the pharmacist if you have any questions.  Self care  The following tips will keep you as comfortable as possible while you recover:     Rest    Drink plenty of water and other liquids    Take a hot shower to loosen congestion    Take a  spoonful of honey to reduce your cough     When to seek care  Please be seen in a clinic or urgent care if new symptoms develop, or symptoms become worse.  COVID-19 (Coronavirus) General Information  With the increase in the number of COVID-19 (Coronavirus) cases, we understand you may have some questions. Below is some helpful information on COVID-19 (Coronavirus).  How can I protect myself and others from the COVID-19 (Coronavirus)?  Because there is currently no vaccine to prevent infection, the best way to protect yourself is to avoid being exposed to this virus. Put distance between yourself and other people if COVID-19 (Coronavirus) is spreading in your community. The virus is thought to spread mainly from person-to-person.     Between people who are in close contact with one another (within about 6 about) for prolonged period (10 minutes or longer).    Through respiratory droplets produced when an infected person coughs or sneezes.     The CDC recommends the following additional steps to protect yourself and others:     Wash your hands often with soap and water for at least 20 seconds, especially after blowing your nose, coughing, or sneezing; going to the bathroom; and before eating or preparing food.  Use an alcohol-based hand  that contains at least 60 percent alcohol if soap and water are not available.        Avoid touching your eyes, nose and mouth with unwashed hands.    Avoid close contact with people who are sick.    Stay home when you are sick.    Cover your cough or sneeze with a tissue, then throw the tissue in the trash.    Clean and disinfect frequently touched objects and surfaces.     You can help stop COVID-19 (Coronavirus) by knowing the signs and symptoms:     Fever    Cough    Shortness of breath     Contact your healthcare provider if   Develop symptoms   AND   Have been in close contact with a person known to have COVID-19 (Coronavirus) or live in or have recently traveled from  an area with ongoing spread of COVID-19 (Coronavirus). Call ahead before you go to a doctor's office or emergency room. Tell them about your recent travel and your symptoms.   For the most up to date information, visit the CDC's website.  Steps to help prevent the spread of COVID-19 (Coronavirus) if you are sick  If you are sick with COVID-19 (Coronavirus) or suspect you are infected with the virus that causes COVID-19 (Coronavirus), follow the steps below to help prevent the disease from spreading&nbsp;to people in your home and community.     Stay home except to get medical care. Home isolation may be started in consultation with your healthcare clinician.    Separate yourself from other people and animals in your home.    Call ahead before visiting your doctor if you have a medical appointment.    Wear a facemask when you are around other people.    Cover your cough and sneezes.    Clean your hands often.    Avoid sharing personal household items.    Clean and disinfect frequently touched objects and surfaces everyday.    You will need to have someone drop off medications or household supplies (if needed) at your house without coming inside or in contact with you or others living in your house.    Monitor your symptoms and seek prompt medical care if your illness is worsening (e.g. Difficulty breathing).    Discontinue home isolation only in consultation with your healthcare provider.     For more detailed and up to date information on what to do if you are sick, visit this link: What to Do If You Are Sick With Coronavirus Disease 2019 (COVID-19).  Do I need to be tested for COVID-19 (Coronavirus)?     At this time, the limited number of tests available are controlled by the state and local health departments and are being reserved for more seriously ill patients, those with known exposure to confirmed patients, and those with recent travel (within 14 days) to countries with high rates of COVID-19 (Coronavirus).  "   Decisions on which patients receive testing will be based on the local spread of COVID-19 (Coronavirus) as well as the symptoms. Your healthcare provider will make the final decision on whether you should be tested.    In the meantime, if you have concerns that you may have been exposed, it is reasonable to practice \"social distancing.\"&nbsp; If you are ill with a cold or flu-like illness, please monitor your symptoms and reach out to your healthcare provider if your symptoms worsen.    For more up to date information, visit this link: COVID-19 (Coronavirus) Frequently Asked Questions and Answers.      Diagnosis: Viral URI  Diagnosis ICD: J06.9  Additional Clinician Notes: I noticed that you reported symptoms for 2-3 weeks.  It is possible that you picked up successive viral respiratory infections in the community, not necessarily Covid.    Your symptoms likely represent a viral upper respiratory infection.  Risk of Covid 19 seems low based on the information you gave me.  I do not think you need to be tested for Covid 19.  Testing supplies are running extremely low right now. You should self-quarantine for a minimum of 7 days after illness onset, or 72 hours after resolution of fever (without taking fever-reducing medications), and improvement of respiratory symptoms, whichever is longer.  "

## 2020-08-17 ENCOUNTER — MYC MEDICAL ADVICE (OUTPATIENT)
Dept: ENDOCRINOLOGY | Facility: CLINIC | Age: 50
End: 2020-08-17

## 2020-08-17 DIAGNOSIS — E89.0 POSTSURGICAL HYPOTHYROIDISM: ICD-10-CM

## 2020-08-17 RX ORDER — LEVOTHYROXINE SODIUM 75 UG/1
TABLET ORAL
Qty: 108 TABLET | Refills: 0 | Status: SHIPPED | OUTPATIENT
Start: 2020-08-17 | End: 2020-11-10

## 2020-10-02 ENCOUNTER — VIRTUAL VISIT (OUTPATIENT)
Dept: URGENT CARE | Facility: CLINIC | Age: 50
End: 2020-10-02
Payer: COMMERCIAL

## 2020-10-02 DIAGNOSIS — Z20.822 EXPOSURE TO 2019 NOVEL CORONAVIRUS: Primary | ICD-10-CM

## 2020-10-02 PROCEDURE — 99213 OFFICE O/P EST LOW 20 MIN: CPT | Mod: 95

## 2020-10-02 NOTE — PROGRESS NOTES
"The patient has been notified of following:     \"This telephone or video visit will be conducted via a call between you and your physician/provider. We have found that certain health care needs can be provided without the need for a physical exam.  This service lets us provide the care you need with a short phone conversation.  If a prescription is necessary we can send it directly to your pharmacy.  If lab work is needed we can place an order for that and you can then stop by our lab to have the test done at a later time.    Telephone or video visits are billed at different rates depending on your insurance coverage. During this emergency period, for some insurers they may be billed the same as an in-person visit.  Please reach out to your insurance provider with any questions.    Patient has given verbal consent for Telephone or video visit?  Yes  Subjective   HPI    Was with his high school buddies   One of them was positive for covid19  Sept 28 last close contact   Friend was tested on sept 30 and was positive   No sore throat no runny nose no cough  No fevers or chills chest pain or shortness of breath   No abdominal pain  No nausea vomiting or diarrhea   No rash  No loss of taste or smell       Patient Active Problem List   Diagnosis     CARDIOVASCULAR SCREENING; LDL GOAL LESS THAN 160     Papillary thyroid carcinoma (H)     H/O partial thyroidectomy     Postsurgical hypothyroidism     Past Surgical History:   Procedure Laterality Date     COLONOSCOPY  3/2/2011    COLONOSCOPY performed by ADRIANNE ROMAN at [facility]     COLONOSCOPY N/A 5/16/2017    Procedure: COLONOSCOPY;  Colonoscopy;  Surgeon: Edinson Torres MD;  Location: [facility]     none         Social History     Tobacco Use     Smoking status: Never Smoker     Smokeless tobacco: Former User   Substance Use Topics     Alcohol use: Yes     Alcohol/week: 0.0 standard drinks     Comment: Occasional     Family History   Problem Relation Age of Onset     " Cancer - colorectal Father      Circulatory Father         Mini strokes     Respiratory Maternal Grandmother         Emphysema     Heart Disease Maternal Grandfather         Heart attack           Reviewed and updated as needed this visit by Provider   Allergies               Review of Systems   Constitutional, HEENT, cardiovascular, pulmonary, GI, , musculoskeletal, neuro, skin, endocrine and psych systems are negative, except as otherwise noted.       Objective   Reported vitals:  There were no vitals taken for this visit.   healthy, alert and no distress  PSYCH: Alert and oriented times 3; coherent speech, normal   rate and volume, able to articulate logical thoughts, able   to abstract reason, no tangential thoughts, no hallucinations   or delusions  His affect is normal  RESP: No cough, no audible wheezing, able to talk in full sentences  Additional exam:  none  Remainder of exam unable to be completed due to telephone visits    Diagnostic Test Results:  Labs reviewed in Epic  none         Assessment/Plan:      ICD-10-CM    1. Exposure to 2019 novel coronavirus  Z20.828 Asymptomatic COVID-19 Virus (Coronavirus) by PCR     Discussed need to self-quarantine for 14 days regardless of test results.  If positive or if patient develops symptoms further restrictions may apply  Alarm signs or symptoms discussed, if present recommend go to ER   Follow up as needed       call duration:  11 minutes  Video: no    Araceli Guido MD

## 2020-10-04 ENCOUNTER — AMBULATORY - HEALTHEAST (OUTPATIENT)
Dept: FAMILY MEDICINE | Facility: CLINIC | Age: 50
End: 2020-10-04

## 2020-10-04 DIAGNOSIS — Z20.822 SUSPECTED COVID-19 VIRUS INFECTION: ICD-10-CM

## 2020-10-05 ENCOUNTER — AMBULATORY - HEALTHEAST (OUTPATIENT)
Dept: FAMILY MEDICINE | Facility: CLINIC | Age: 50
End: 2020-10-05

## 2020-10-05 DIAGNOSIS — Z20.822 SUSPECTED COVID-19 VIRUS INFECTION: ICD-10-CM

## 2020-10-07 ENCOUNTER — COMMUNICATION - HEALTHEAST (OUTPATIENT)
Dept: SCHEDULING | Facility: CLINIC | Age: 50
End: 2020-10-07

## 2020-10-20 ENCOUNTER — MYC MEDICAL ADVICE (OUTPATIENT)
Dept: ENDOCRINOLOGY | Facility: CLINIC | Age: 50
End: 2020-10-20

## 2020-11-03 ENCOUNTER — HOSPITAL ENCOUNTER (OUTPATIENT)
Dept: ULTRASOUND IMAGING | Facility: CLINIC | Age: 50
End: 2020-11-03
Attending: INTERNAL MEDICINE
Payer: COMMERCIAL

## 2020-11-03 ENCOUNTER — MYC MEDICAL ADVICE (OUTPATIENT)
Dept: ENDOCRINOLOGY | Facility: CLINIC | Age: 50
End: 2020-11-03

## 2020-11-03 DIAGNOSIS — Z20.822 EXPOSURE TO 2019 NOVEL CORONAVIRUS: ICD-10-CM

## 2020-11-03 DIAGNOSIS — C73 PAPILLARY THYROID CARCINOMA (H): ICD-10-CM

## 2020-11-03 LAB — TSH SERPL DL<=0.005 MIU/L-ACNC: 0.72 MU/L (ref 0.4–4)

## 2020-11-03 PROCEDURE — 76536 US EXAM OF HEAD AND NECK: CPT

## 2020-11-03 PROCEDURE — 36415 COLL VENOUS BLD VENIPUNCTURE: CPT | Performed by: INTERNAL MEDICINE

## 2020-11-03 PROCEDURE — 84443 ASSAY THYROID STIM HORMONE: CPT | Performed by: INTERNAL MEDICINE

## 2020-11-10 DIAGNOSIS — E89.0 POSTSURGICAL HYPOTHYROIDISM: ICD-10-CM

## 2020-11-10 RX ORDER — LEVOTHYROXINE SODIUM 75 UG/1
TABLET ORAL
Qty: 108 TABLET | Refills: 0 | Status: SHIPPED | OUTPATIENT
Start: 2020-11-10 | End: 2021-02-01

## 2020-11-10 NOTE — TELEPHONE ENCOUNTER
Medication filled per [facility] protocol.     Rafael Recio RN....11/10/2020 1:30 PM

## 2020-11-11 ENCOUNTER — VIRTUAL VISIT (OUTPATIENT)
Dept: ENDOCRINOLOGY | Facility: CLINIC | Age: 50
End: 2020-11-11
Payer: COMMERCIAL

## 2020-11-11 DIAGNOSIS — C73 PAPILLARY THYROID CARCINOMA (H): ICD-10-CM

## 2020-11-11 DIAGNOSIS — E89.0 POSTSURGICAL HYPOTHYROIDISM: ICD-10-CM

## 2020-11-11 DIAGNOSIS — E89.0 POSTSURGICAL HYPOTHYROIDISM: Primary | ICD-10-CM

## 2020-11-11 PROCEDURE — 99213 OFFICE O/P EST LOW 20 MIN: CPT | Mod: 95 | Performed by: INTERNAL MEDICINE

## 2020-11-11 RX ORDER — LEVOTHYROXINE SODIUM 75 UG/1
TABLET ORAL
Qty: 108 TABLET | Refills: 0 | OUTPATIENT
Start: 2020-11-11

## 2020-11-11 NOTE — LETTER
"    11/11/2020         RE: Bong Rowley  [address]  [facility] [address]        Dear Colleague,    Thank you for referring your patient, Bong Rowley, to the [facility]. Please see a copy of my visit note below.    Bong Rowley is a 50 year old male who is being evaluated via a billable telephone visit.      The patient has been notified of following:     \"This telephone visit will be conducted via a call between you and your physician/provider. We have found that certain health care needs can be provided without the need for a physical exam.  This service lets us provide the care you need with a short phone conversation.  If a prescription is necessary we can send it directly to your pharmacy.  If lab work is needed we can place an order for that and you can then stop by our lab to have the test done at a later time.    Telephone visits are billed at different rates depending on your insurance coverage. During this emergency period, for some insurers they may be billed the same as an in-person visit.  Please reach out to your insurance provider with any questions.    If during the course of the call the physician/provider feels a telephone visit is not appropriate, you will not be charged for this service.\"    Patient has given verbal consent for Telephone visit?  Yes    What phone number would you like to be contacted at? [phone]    How would you like to obtain your AVS? Susana    CC: Papillary thyroid cancer.     HPI: Patient presents for evaluation of papillary thyroid cancer.   At his yearly exam in 5/2019, his MD noticed a nodule.       US 6/14/19:      He underwent FNA of the right nodule which was positive for PTC.     US neck 7/5/19:  FINDINGS: On the right and elongated level 2 lymph node measures 0.4 x  1.4 cm. No discrete fatty hilum is seen.     On the left an elongated level 2 lymph node is seen and measures 0.5 x  1.5 cm. It has a small fatty hilum.             "                                                          IMPRESSION:  A single subcentimeter in short axis dimension Level 2  lymph node is seen in each side of the neck.    Surgery on 7/23/19:  SPECIMEN   Procedure:    Right lobectomy     TUMOR   Histologic Type:    Papillary carcinoma, classic (usual, conventional)    Tumor Size in Centimeters (cm):    Greatest dimension in Centimeters (cm): 4.6 Centimeters (cm)   Tumor Site:    Right lobe    Tumor Focality:    Unifocal    Tumor Extent:         Extrathyroidal Extension:    Not identified    Accessory Findings:         Angioinvasion (vascular invasion):    Not identified      Lymphatic Invasion:    Not identified      Perineural Invasion:    Not identified     MARGINS   Margins:    Uninvolved by carcinoma      Distance of Invasive Carcinoma from Closest Margin in Millimeters (mm):    1 Millimeters (mm)    LYMPH NODES   Number of Lymph Nodes Involved:    0    Number of Lymph Nodes Examined:    1      Hiro Levels:    Level VI - pretracheal, paratracheal and prelaryngeal / Delphian, perithyroidal (central compartment dissection)     PATHOLOGIC STAGE CLASSIFICATION (pTNM, AJCC 8th Edition)   :         Primary Tumor (pT):    pT3a    Regional Lymph Nodes (pN):    pN0a     Recalls having a sore throat recently that resolved in 48 hours.   No dysphagia.     He is taking 75 mcg every day five days a week and 150 mcg the other two days.   Notes he has felt more tired recently, the last 6 weeks.   No dark or bloody stools.     ROS: 10 point ROS neg other than the symptoms noted above in the HPI.    Exam:   Gen: In NAD    A/P:   Papillary thyroid cancer - Outside records reviewed. Right lobectomy 7/23/19. pT3a pN0a. We discussed the thyroid guidelines, lobectomy for nodules <4 cm and total thyroidectomy for nodules >4 cm. His pre-op ultrasound did not show any nodules on the left. We discussed observation versus completion thyroidectomy. Single LN seen pre-op on the left had  "a fatty zac so no pre-op evidence of disease on the left.   US 11/26/19: \"There are a few stable subcentimeter in short axis dimension lymph nodes from 7/5/2019.\" AND \"Normal sonographic appearance all the left thyroid lobe.\"   US 11/3/2020: \"A few bilateral subcentimeter in short axis dimension  lymph nodes are present. None stand out or are concerning in  Appearance.\" In more detail in the report, there is a LN at level 5 on the right which is <1 cm and does not have a clear fatty hilum.  We will follow this LN.   -No change to levothyroxine dose.   -Ultrasound in 3 months.   -Labs in 3 months.     Due to the COVID 19 pandemic this visit was a telephone/video visit in order to help prevent spread of infection in this high risk patient and the general population. The patient gave verbal consent for the visit today.    Start time 0829  Stop time 0845  Total time 16  This visit would have been billed as [id number] as an E & M code     Marvin Cannon MD on 11/11/2020 at 8:45 AM                  Again, thank you for allowing me to participate in the care of your patient.        Sincerely,        Marvin Cannon MD    "

## 2020-11-11 NOTE — PROGRESS NOTES
"Bong Rowley is a 50 year old male who is being evaluated via a billable telephone visit.      The patient has been notified of following:     \"This telephone visit will be conducted via a call between you and your physician/provider. We have found that certain health care needs can be provided without the need for a physical exam.  This service lets us provide the care you need with a short phone conversation.  If a prescription is necessary we can send it directly to your pharmacy.  If lab work is needed we can place an order for that and you can then stop by our lab to have the test done at a later time.    Telephone visits are billed at different rates depending on your insurance coverage. During this emergency period, for some insurers they may be billed the same as an in-person visit.  Please reach out to your insurance provider with any questions.    If during the course of the call the physician/provider feels a telephone visit is not appropriate, you will not be charged for this service.\"    Patient has given verbal consent for Telephone visit?  Yes    What phone number would you like to be contacted at? [phone]    How would you like to obtain your AVS? Susana    CC: Papillary thyroid cancer.     HPI: Patient presents for evaluation of papillary thyroid cancer.   At his yearly exam in 5/2019, his MD noticed a nodule.       US 6/14/19:      He underwent FNA of the right nodule which was positive for PTC.     US neck 7/5/19:  FINDINGS: On the right and elongated level 2 lymph node measures 0.4 x  1.4 cm. No discrete fatty hilum is seen.     On the left an elongated level 2 lymph node is seen and measures 0.5 x  1.5 cm. It has a small fatty hilum.                                                                      IMPRESSION:  A single subcentimeter in short axis dimension Level 2  lymph node is seen in each side of the neck.    Surgery on 7/23/19:  SPECIMEN   Procedure:    Right lobectomy     TUMOR   " "Histologic Type:    Papillary carcinoma, classic (usual, conventional)    Tumor Size in Centimeters (cm):    Greatest dimension in Centimeters (cm): 4.6 Centimeters (cm)   Tumor Site:    Right lobe    Tumor Focality:    Unifocal    Tumor Extent:         Extrathyroidal Extension:    Not identified    Accessory Findings:         Angioinvasion (vascular invasion):    Not identified      Lymphatic Invasion:    Not identified      Perineural Invasion:    Not identified     MARGINS   Margins:    Uninvolved by carcinoma      Distance of Invasive Carcinoma from Closest Margin in Millimeters (mm):    1 Millimeters (mm)    LYMPH NODES   Number of Lymph Nodes Involved:    0    Number of Lymph Nodes Examined:    1      Hiro Levels:    Level VI - pretracheal, paratracheal and prelaryngeal / Delphian, perithyroidal (central compartment dissection)     PATHOLOGIC STAGE CLASSIFICATION (pTNM, AJCC 8th Edition)   :         Primary Tumor (pT):    pT3a    Regional Lymph Nodes (pN):    pN0a     Recalls having a sore throat recently that resolved in 48 hours.   No dysphagia.     He is taking 75 mcg every day five days a week and 150 mcg the other two days.   Notes he has felt more tired recently, the last 6 weeks.   No dark or bloody stools.     ROS: 10 point ROS neg other than the symptoms noted above in the HPI.    Exam:   Gen: In NAD    A/P:   Papillary thyroid cancer - Outside records reviewed. Right lobectomy 7/23/19. pT3a pN0a. We discussed the thyroid guidelines, lobectomy for nodules <4 cm and total thyroidectomy for nodules >4 cm. His pre-op ultrasound did not show any nodules on the left. We discussed observation versus completion thyroidectomy. Single LN seen pre-op on the left had a fatty zac so no pre-op evidence of disease on the left.   US 11/26/19: \"There are a few stable subcentimeter in short axis dimension lymph nodes from 7/5/2019.\" AND \"Normal sonographic appearance all the left thyroid lobe.\"   US 11/3/2020: \"A " "few bilateral subcentimeter in short axis dimension  lymph nodes are present. None stand out or are concerning in  Appearance.\" In more detail in the report, there is a LN at level 5 on the right which is <1 cm and does not have a clear fatty hilum.  We will follow this LN.   -No change to levothyroxine dose.   -Ultrasound in 3 months.   -Labs in 3 months.     Due to the COVID 19 pandemic this visit was a telephone/video visit in order to help prevent spread of infection in this high risk patient and the general population. The patient gave verbal consent for the visit today.    Start time 0829  Stop time 0845  Total time 16  This visit would have been billed as [id number] as an E & M code     Marvin Cannon MD on 11/11/2020 at 8:45 AM              "

## 2020-11-12 DIAGNOSIS — E89.0 POSTSURGICAL HYPOTHYROIDISM: ICD-10-CM

## 2020-11-13 RX ORDER — LEVOTHYROXINE SODIUM 75 UG/1
TABLET ORAL
Qty: 108 TABLET | Refills: 0 | OUTPATIENT
Start: 2020-11-13

## 2020-11-29 ENCOUNTER — HEALTH MAINTENANCE LETTER (OUTPATIENT)
Age: 50
End: 2020-11-29

## 2021-02-01 DIAGNOSIS — E89.0 POSTSURGICAL HYPOTHYROIDISM: ICD-10-CM

## 2021-02-01 RX ORDER — LEVOTHYROXINE SODIUM 75 UG/1
TABLET ORAL
Qty: 108 TABLET | Refills: 0 | Status: SHIPPED | OUTPATIENT
Start: 2021-02-01 | End: 2021-04-21

## 2021-04-05 ENCOUNTER — ANCILLARY PROCEDURE (OUTPATIENT)
Dept: ULTRASOUND IMAGING | Facility: CLINIC | Age: 51
End: 2021-04-05
Attending: INTERNAL MEDICINE
Payer: COMMERCIAL

## 2021-04-05 DIAGNOSIS — C73 PAPILLARY THYROID CARCINOMA (H): ICD-10-CM

## 2021-04-05 DIAGNOSIS — E89.0 POSTSURGICAL HYPOTHYROIDISM: ICD-10-CM

## 2021-04-05 LAB — TSH SERPL DL<=0.005 MIU/L-ACNC: 0.53 MU/L (ref 0.4–4)

## 2021-04-05 PROCEDURE — 84443 ASSAY THYROID STIM HORMONE: CPT | Performed by: INTERNAL MEDICINE

## 2021-04-05 PROCEDURE — 36415 COLL VENOUS BLD VENIPUNCTURE: CPT | Performed by: INTERNAL MEDICINE

## 2021-04-06 ENCOUNTER — MYC MEDICAL ADVICE (OUTPATIENT)
Dept: ENDOCRINOLOGY | Facility: CLINIC | Age: 51
End: 2021-04-06

## 2021-04-06 DIAGNOSIS — C73 PAPILLARY THYROID CARCINOMA (H): Primary | ICD-10-CM

## 2021-04-06 NOTE — TELEPHONE ENCOUNTER
Patient called the clinic stating he is concerned because his last phone visit only lasted a few minutes and cost $240. He is wondering if an appointment is really necessary based on his lab and ultrasound results which appeared to be stable besides a new lymph node. He reports he is feeling well. He would prefer to schedule a follow up at later time unless something needs to be done for the lymph node.    Additionally, he states he is a  and received a letter from the [employer] which stated they need a letter regarding his medical progress. Patient forwarded them all of this test results but states he may need a letter from Dr. Cannon as well. He will send a [employer] message if this is needed.    (send patient [employer] message with update)    Rafael MERCADO RN....4/6/2021 11:28 AM

## 2021-04-19 ENCOUNTER — MYC MEDICAL ADVICE (OUTPATIENT)
Dept: ENDOCRINOLOGY | Facility: CLINIC | Age: 51
End: 2021-04-19

## 2021-04-19 NOTE — LETTER
Bong Rowley  [address] [place] [address]      April 22, 2021      To whom it may concern,    The above listed person is a patient of mine.   He has a history of papillary thyroid cancer.   He underwent surgery on 7/23/19 to have the right half of his thyroid removed.   Since that time, we have been monitoring him with ultrasound.   He is taking levothyroxine 75 mcg daily five days and week and 150 mcg the other two days to ensure normal levels of thyroid hormone.   Follow up consists or labs and ultrasound examination every 3-6 months.   His prognosis is good.     Sincerely,    Marvin Cannon MD

## 2021-04-21 DIAGNOSIS — E89.0 POSTSURGICAL HYPOTHYROIDISM: ICD-10-CM

## 2021-04-21 RX ORDER — LEVOTHYROXINE SODIUM 75 UG/1
TABLET ORAL
Qty: 108 TABLET | Refills: 0 | Status: SHIPPED | OUTPATIENT
Start: 2021-04-21 | End: 2021-07-21

## 2021-04-21 NOTE — TELEPHONE ENCOUNTER
Prescription approved per [facility] Refill Protocol.    Rafael MERCADO RN....4/21/2021 10:59 AM

## 2021-04-22 DIAGNOSIS — E89.0 POSTSURGICAL HYPOTHYROIDISM: ICD-10-CM

## 2021-04-22 RX ORDER — LEVOTHYROXINE SODIUM 75 UG/1
TABLET ORAL
Qty: 108 TABLET | Refills: 0 | OUTPATIENT
Start: 2021-04-22

## 2021-05-24 ENCOUNTER — RECORDS - HEALTHEAST (OUTPATIENT)
Dept: ADMINISTRATIVE | Facility: CLINIC | Age: 51
End: 2021-05-24

## 2021-07-21 DIAGNOSIS — E89.0 POSTSURGICAL HYPOTHYROIDISM: ICD-10-CM

## 2021-07-21 RX ORDER — LEVOTHYROXINE SODIUM 75 UG/1
TABLET ORAL
Qty: 108 TABLET | Refills: 0 | Status: SHIPPED | OUTPATIENT
Start: 2021-07-21 | End: 2021-07-26

## 2021-07-21 NOTE — TELEPHONE ENCOUNTER
Prescription approved per [facility] Refill Protocol.    Rafael MERCADO RN....7/21/2021 11:23 AM

## 2021-07-22 DIAGNOSIS — E89.0 POSTSURGICAL HYPOTHYROIDISM: ICD-10-CM

## 2021-07-22 RX ORDER — LEVOTHYROXINE SODIUM 75 UG/1
TABLET ORAL
Qty: 108 TABLET | Refills: 0 | OUTPATIENT
Start: 2021-07-22

## 2021-07-23 DIAGNOSIS — E89.0 POSTSURGICAL HYPOTHYROIDISM: ICD-10-CM

## 2021-07-26 RX ORDER — LEVOTHYROXINE SODIUM 75 UG/1
TABLET ORAL
Qty: 108 TABLET | Refills: 0 | Status: SHIPPED | OUTPATIENT
Start: 2021-07-26 | End: 2021-10-13

## 2021-07-26 NOTE — TELEPHONE ENCOUNTER
Prescription approved per [facility] Refill Protocol.  Ingris Mendoza RN, BSN Specialty Clinics

## 2021-09-19 ENCOUNTER — HEALTH MAINTENANCE LETTER (OUTPATIENT)
Age: 51
End: 2021-09-19

## 2021-10-13 DIAGNOSIS — E89.0 POSTSURGICAL HYPOTHYROIDISM: ICD-10-CM

## 2021-10-13 RX ORDER — LEVOTHYROXINE SODIUM 75 UG/1
TABLET ORAL
Qty: 108 TABLET | Refills: 0 | Status: SHIPPED | OUTPATIENT
Start: 2021-10-13 | End: 2022-01-05

## 2021-10-13 NOTE — TELEPHONE ENCOUNTER
Pending Prescriptions:                       Disp   Refills    levothyroxine (SYNTHROID/LEVOTHROID) 75 M*108 ta*0            Sig: TAKE ONE TABLET BY MOUTH DAILY  5 DAYS OF THE           WEEK AND TAKE TWO TABLETS BY MOUTH DAILY  2 DAYS           OF THE WEEK    RN refilled medication per [facility] Refill Protocol.       Anabela PABLO RN, Specialty Clinic 10/13/21 11:33 AM

## 2022-01-05 DIAGNOSIS — E89.0 POSTSURGICAL HYPOTHYROIDISM: ICD-10-CM

## 2022-01-05 RX ORDER — LEVOTHYROXINE SODIUM 75 UG/1
TABLET ORAL
Qty: 108 TABLET | Refills: 0 | Status: SHIPPED | OUTPATIENT
Start: 2022-01-05 | End: 2022-04-01

## 2022-01-05 NOTE — TELEPHONE ENCOUNTER
Routing refill request to provider for review/approval because:  Patient needs to be seen because it has been more than 1 year since last office visit.    LOV 11/11/20.    Rafael MERCADO RN....1/5/2022 8:30 AM

## 2022-01-09 ENCOUNTER — HEALTH MAINTENANCE LETTER (OUTPATIENT)
Age: 52
End: 2022-01-09

## 2022-04-20 ENCOUNTER — DOCUMENTATION ONLY (OUTPATIENT)
Dept: LAB | Facility: CLINIC | Age: 52
End: 2022-04-20
Payer: COMMERCIAL

## 2022-04-20 NOTE — PROGRESS NOTES
Bong Rowley has an upcoming lab appointment:    Future Appointments   Date Time Provider Department Center   4/21/2022  1:45 PM [facility]     Patient is scheduled for the following lab(s): TSH    There is no order available. Please review and place either future orders or HMPO (Review of Health Maintenance Protocol Orders), as appropriate.    Health Maintenance Due   Topic     ANNUAL REVIEW OF HM ORDERS      HIV SCREENING      HEPATITIS C SCREENING      Marisa Wright

## 2022-04-21 ENCOUNTER — VIRTUAL VISIT (OUTPATIENT)
Dept: FAMILY MEDICINE | Facility: CLINIC | Age: 52
End: 2022-04-21

## 2022-04-21 ENCOUNTER — APPOINTMENT (OUTPATIENT)
Dept: LAB | Facility: CLINIC | Age: 52
End: 2022-04-21
Payer: COMMERCIAL

## 2022-04-21 DIAGNOSIS — Z12.11 SCREENING FOR COLON CANCER: ICD-10-CM

## 2022-04-21 DIAGNOSIS — Z80.0 FAMILY HISTORY OF COLON CANCER: ICD-10-CM

## 2022-04-21 DIAGNOSIS — C73 PAPILLARY THYROID CARCINOMA (H): Primary | ICD-10-CM

## 2022-04-21 DIAGNOSIS — E89.0 POSTSURGICAL HYPOTHYROIDISM: ICD-10-CM

## 2022-04-21 PROCEDURE — 99213 OFFICE O/P EST LOW 20 MIN: CPT | Mod: TEL | Performed by: PHYSICIAN ASSISTANT

## 2022-04-21 RX ORDER — LEVOTHYROXINE SODIUM 75 UG/1
TABLET ORAL
Qty: 120 TABLET | Refills: 3 | Status: SHIPPED | OUTPATIENT
Start: 2022-04-21 | End: 2023-05-05

## 2022-04-21 NOTE — PATIENT INSTRUCTIONS
Lab appointment scheduled for 10am on 4/22 (Friday). Come fasting (nothing to eat after midnight tonight) so we can also update your cholesterol     Refills for your levothyroxine were sent to the pharmacy (if the dose needs to be adjusted based on your labs tomorrow I will send in a new script)    Given the new level 2 lymph node noted last year on ultrasound, we should repeat that again this year. Orders have been placed. You can call [phone] to get this scheduled at your convenience      They will be reaching out to you within 1-2 days to schedule the colonoscopy. If you have not heard from the scheduling office within 2 business days, please call [phone]

## 2022-04-21 NOTE — PROGRESS NOTES
Patient here for TSH,  No orders available.  Chart reviewed.  Patient was given 90 day krunal refill 3 months ago and advised to scheudle visit.    Patient was last seen by [facility] provider 1/2020.    mir Cunningham relayed to patient need for scheduling appointment with PCP and Endocrinology.  Leonela Cabrera RN

## 2022-04-21 NOTE — PROGRESS NOTES
Bong is a 51 year old who is being evaluated via a billable telephone visit.      What phone number would you like to be contacted at? [phone]  How would you like to obtain your AVS? MyChart    Assessment & Plan         ICD-10-CM    1. Papillary thyroid carcinoma (H)  C73 TSH with free T4 reflex     Lipid panel reflex to direct LDL Fasting     Glucose     US Thyroid     US Head Neck Soft Tissue   2. Postsurgical hypothyroidism  E89.0 TSH with free T4 reflex     Lipid panel reflex to direct LDL Fasting     Glucose     US Thyroid     US Head Neck Soft Tissue     levothyroxine (SYNTHROID/LEVOTHROID) 75 MCG tablet   3. Screening for colon cancer  Z12.11 Adult Gastro Ref - Procedure Only   4. Family history of colon cancer  Z80.0 Adult Gastro Ref - Procedure Only       Patient Instructions   Lab appointment scheduled for 10am on 4/22 (Friday). Come fasting (nothing to eat after midnight tonight) so we can also update your cholesterol     Refills for your levothyroxine were sent to the pharmacy (if the dose needs to be adjusted based on your labs tomorrow I will send in a new script)    Given the new level 2 lymph node noted last year on ultrasound, we should repeat that again this year. Orders have been placed. You can call [phone] to get this scheduled at your convenience      They will be reaching out to you within 1-2 days to schedule the colonoscopy. If you have not heard from the scheduling office within 2 business days, please call [phone]                  Return in about 1 day (around 4/22/2022) for Lab Work.    Ebonie Onofre PA-C  [facility] EUGENIO Woody is a 51 year old who presents for the following health issues     HPI     Medication Followup of Levothyroxine 75 mcg tablet     Taking Medication as prescribed: yes    Side Effects:  None    Medication Helping Symptoms:  yes     -He was told he needs to be seen before getting more of his Thyroid medication. He  used to see Dr. Cannon and he said it was not a good fit. He is wanting orders to get his thyroid levels checked as well.     -He would like an order for a colonoscopy.      Wed and Sunday takes the 2 pills, the other days he takes 75mcg  Has been a little bit goofy right now because he flies internationally so sometimes the schedule can be off    His schedule also makes it difficult to get in for appointments  He is in town now so would be able to come for labs tomorrow    Also thinks he is due for a colonoscopy  Family history (dad) with colon cancer    Review of Systems   Remainder of ROS obtained and found to be negative other than that which was documented above        Objective           Vitals:  No vitals were obtained today due to virtual visit.    Physical Exam   healthy, alert and no distress  PSYCH: Alert and oriented times 3; coherent speech, normal   rate and volume, able to articulate logical thoughts, able   to abstract reason, no tangential thoughts, no hallucinations   or delusions  His affect is normal  RESP: No cough, no audible wheezing, able to talk in full sentences  Remainder of exam unable to be completed due to telephone visits    Diagnostic Tests:  Pending  Reviewed previous imaging and labs in [facility]        Phone call duration: 10 minutes

## 2022-04-22 ENCOUNTER — LAB (OUTPATIENT)
Dept: LAB | Facility: CLINIC | Age: 52
End: 2022-04-22
Payer: COMMERCIAL

## 2022-04-22 DIAGNOSIS — C73 PAPILLARY THYROID CARCINOMA (H): ICD-10-CM

## 2022-04-22 DIAGNOSIS — E89.0 POSTSURGICAL HYPOTHYROIDISM: ICD-10-CM

## 2022-04-22 LAB
CHOLEST SERPL-MCNC: 280 MG/DL
FASTING STATUS PATIENT QL REPORTED: YES
FASTING STATUS PATIENT QL REPORTED: YES
GLUCOSE BLD-MCNC: 101 MG/DL (ref 70–99)
HDLC SERPL-MCNC: 54 MG/DL
LDLC SERPL CALC-MCNC: 193 MG/DL
NONHDLC SERPL-MCNC: 226 MG/DL
TRIGL SERPL-MCNC: 164 MG/DL
TSH SERPL DL<=0.005 MIU/L-ACNC: 1.34 MU/L (ref 0.4–4)

## 2022-04-22 PROCEDURE — 36415 COLL VENOUS BLD VENIPUNCTURE: CPT

## 2022-04-22 PROCEDURE — 84443 ASSAY THYROID STIM HORMONE: CPT

## 2022-04-22 PROCEDURE — 80061 LIPID PANEL: CPT

## 2022-04-22 PROCEDURE — 82947 ASSAY GLUCOSE BLOOD QUANT: CPT

## 2022-11-21 ENCOUNTER — HEALTH MAINTENANCE LETTER (OUTPATIENT)
Age: 52
End: 2022-11-21

## 2023-04-16 ENCOUNTER — HEALTH MAINTENANCE LETTER (OUTPATIENT)
Age: 53
End: 2023-04-16

## 2023-05-29 ENCOUNTER — TELEPHONE (OUTPATIENT)
Dept: FAMILY MEDICINE | Facility: CLINIC | Age: 53
End: 2023-05-29
Payer: COMMERCIAL

## 2023-05-29 DIAGNOSIS — E89.0 POSTSURGICAL HYPOTHYROIDISM: ICD-10-CM

## 2023-05-29 DIAGNOSIS — Z13.6 CARDIOVASCULAR SCREENING; LDL GOAL LESS THAN 160: Primary | ICD-10-CM

## 2023-05-31 RX ORDER — LEVOTHYROXINE SODIUM 75 UG/1
TABLET ORAL
Qty: 20 TABLET | Refills: 0 | Status: SHIPPED | OUTPATIENT
Start: 2023-05-31 | End: 2023-06-30

## 2023-05-31 NOTE — TELEPHONE ENCOUNTER
"Routing refill request to provider for review/approval because:  Isa given x1 and patient did not follow up, please advise  Labs not current:  TSH  Patient needs to be seen because it has been more than 1 year since last office visit.        Last Written Prescription Date: 05/05/2023  Last Fill Quantity: 40, # refills: 0  Last office visit provider: 04/21/2022        Requested Prescriptions   Pending Prescriptions Disp Refills     levothyroxine (SYNTHROID/LEVOTHROID) 75 MCG tablet [Pharmacy Med Name: LEVOTHYROXINE SODIUM 75MCG TABS] 40 tablet 0     Sig: TAKE ONE TABLET BY MOUTH DAILY  5 DAYS OF THE WEEK AND TAKE TWO TABLETS BY MOUTH DAILY  2 DAYS OF THE WEEK.       Thyroid Protocol Failed - 5/29/2023  5:01 AM        Failed - Recent (12 mo) or future (30 days) visit within the authorizing provider's specialty     Patient has had an office visit with the authorizing provider or a provider within the authorizing providers department within the previous 12 mos or has a future within next 30 days. See \"Patient Info\" tab in inbasket, or \"Choose Columns\" in Meds & Orders section of the refill encounter.              Failed - Normal TSH on file in past 12 months     Recent Labs   Lab Test 04/22/22  0955   TSH 1.34              Passed - Patient is 12 years or older        Passed - Medication is active on med list                 Ying Hughes RN 05/31/23 11:27 AM  "

## 2023-05-31 NOTE — TELEPHONE ENCOUNTER
He was given a message on 5/5 to schedule labs and a follow up - nothing scheduled. Last refill - 2 weeks only. Patient needs to be called to schedule    Ebonie Onofre PA-C

## 2023-06-02 NOTE — TELEPHONE ENCOUNTER
Patient has a lab on 6/5/2023 and a visit on 6/6/2023.  Patient is also wondering if his 6/6 visit can be virtual.        Maylin Mitchell, MICHAEL Hagen

## 2023-06-02 NOTE — TELEPHONE ENCOUNTER
Patient scheduled a lab visit but no follow up visit scheduled yet. Sent another [employer] message.   Ying Hughes RN on 6/2/2023 at 9:58 AM

## 2023-06-05 ENCOUNTER — LAB (OUTPATIENT)
Dept: LAB | Facility: CLINIC | Age: 53
End: 2023-06-05
Payer: COMMERCIAL

## 2023-06-05 DIAGNOSIS — Z13.6 CARDIOVASCULAR SCREENING; LDL GOAL LESS THAN 160: ICD-10-CM

## 2023-06-05 DIAGNOSIS — E89.0 POSTSURGICAL HYPOTHYROIDISM: ICD-10-CM

## 2023-06-05 LAB
ANION GAP SERPL CALCULATED.3IONS-SCNC: 7 MMOL/L (ref 7–15)
BUN SERPL-MCNC: 8.9 MG/DL (ref 6–20)
CALCIUM SERPL-MCNC: 9.7 MG/DL (ref 8.6–10)
CHLORIDE SERPL-SCNC: 103 MMOL/L (ref 98–107)
CHOLEST SERPL-MCNC: 275 MG/DL
CREAT SERPL-MCNC: 0.97 MG/DL (ref 0.67–1.17)
DEPRECATED HCO3 PLAS-SCNC: 30 MMOL/L (ref 22–29)
GFR SERPL CREATININE-BSD FRML MDRD: >90 ML/MIN/1.73M2
GLUCOSE SERPL-MCNC: 110 MG/DL (ref 70–99)
HDLC SERPL-MCNC: 65 MG/DL
LDLC SERPL CALC-MCNC: 176 MG/DL
NONHDLC SERPL-MCNC: 210 MG/DL
POTASSIUM SERPL-SCNC: 5.1 MMOL/L (ref 3.4–5.3)
SODIUM SERPL-SCNC: 140 MMOL/L (ref 136–145)
TRIGL SERPL-MCNC: 172 MG/DL
TSH SERPL DL<=0.005 MIU/L-ACNC: 1.85 UIU/ML (ref 0.3–4.2)

## 2023-06-05 PROCEDURE — 80061 LIPID PANEL: CPT

## 2023-06-05 PROCEDURE — 84443 ASSAY THYROID STIM HORMONE: CPT

## 2023-06-05 PROCEDURE — 36415 COLL VENOUS BLD VENIPUNCTURE: CPT

## 2023-06-05 PROCEDURE — 80048 BASIC METABOLIC PNL TOTAL CA: CPT

## 2023-06-05 NOTE — TELEPHONE ENCOUNTER
Lab orders placed. His last visit a year ago was virtual. He really needs to be seen in person  Ebonie Onofre PA-C

## 2023-07-23 DIAGNOSIS — E89.0 POSTSURGICAL HYPOTHYROIDISM: ICD-10-CM

## 2023-07-24 RX ORDER — LEVOTHYROXINE SODIUM 75 UG/1
TABLET ORAL
Qty: 120 TABLET | Refills: 1 | Status: SHIPPED | OUTPATIENT
Start: 2023-07-24 | End: 2024-01-15

## 2023-07-24 NOTE — TELEPHONE ENCOUNTER
"Routing refill request to provider for review/approval because:  Patient needs to be seen because it has been more than 1 year since last office visit.- Mychart sent to patient.             Requested Prescriptions   Pending Prescriptions Disp Refills    levothyroxine (SYNTHROID/LEVOTHROID) 75 MCG tablet [Pharmacy Med Name: LEVOTHYROXINE SODIUM 75MCG TABS] 40 tablet 0     Sig: TAKE ONE TABLET BY MOUTH DAILY  5 DAYS OF THE WEEK AND TAKE TWO TABLETS BY MOUTH DAILY  2 DAYS OF THE WEEK.       Thyroid Protocol Failed - 7/23/2023  5:03 AM        Failed - Recent (12 mo) or future (30 days) visit within the authorizing provider's specialty     Patient has had an office visit with the authorizing provider or a provider within the authorizing providers department within the previous 12 mos or has a future within next 30 days. See \"Patient Info\" tab in inbasket, or \"Choose Columns\" in Meds & Orders section of the refill encounter.              Passed - Patient is 12 years or older        Passed - Medication is active on med list        Passed - Normal TSH on file in past 12 months     Recent Labs   Lab Test 06/05/23  1012   TSH 1.85                       Ying Hughes RN 07/24/23 11:25 AM   "

## 2023-09-17 ASSESSMENT — ENCOUNTER SYMPTOMS
CHILLS: 0
ARTHRALGIAS: 0
SHORTNESS OF BREATH: 0
HEADACHES: 0
DYSURIA: 0
CONSTIPATION: 0
COUGH: 0
JOINT SWELLING: 0
ABDOMINAL PAIN: 0
FEVER: 0
FREQUENCY: 0
SORE THROAT: 0
DIARRHEA: 0
MYALGIAS: 0
PALPITATIONS: 0
DIZZINESS: 0
HEARTBURN: 0
WEAKNESS: 0
HEMATURIA: 0
PARESTHESIAS: 0
EYE PAIN: 0
NAUSEA: 0
NERVOUS/ANXIOUS: 0
HEMATOCHEZIA: 0

## 2023-09-18 ENCOUNTER — OFFICE VISIT (OUTPATIENT)
Dept: FAMILY MEDICINE | Facility: CLINIC | Age: 53
End: 2023-09-18
Payer: COMMERCIAL

## 2023-09-18 VITALS
WEIGHT: 190 LBS | SYSTOLIC BLOOD PRESSURE: 138 MMHG | BODY MASS INDEX: 29.82 KG/M2 | DIASTOLIC BLOOD PRESSURE: 88 MMHG | OXYGEN SATURATION: 100 % | TEMPERATURE: 97 F | HEIGHT: 67 IN | HEART RATE: 88 BPM | RESPIRATION RATE: 14 BRPM

## 2023-09-18 DIAGNOSIS — Z12.5 SCREENING FOR PROSTATE CANCER: ICD-10-CM

## 2023-09-18 DIAGNOSIS — Z12.11 SCREEN FOR COLON CANCER: ICD-10-CM

## 2023-09-18 DIAGNOSIS — Z11.4 SCREENING FOR HIV (HUMAN IMMUNODEFICIENCY VIRUS): ICD-10-CM

## 2023-09-18 DIAGNOSIS — Z00.00 ROUTINE GENERAL MEDICAL EXAMINATION AT A HEALTH CARE FACILITY: Primary | ICD-10-CM

## 2023-09-18 DIAGNOSIS — Z11.59 NEED FOR HEPATITIS C SCREENING TEST: ICD-10-CM

## 2023-09-18 DIAGNOSIS — Z13.6 CARDIOVASCULAR SCREENING; LDL GOAL LESS THAN 130: ICD-10-CM

## 2023-09-18 DIAGNOSIS — C73 PAPILLARY THYROID CARCINOMA (H): ICD-10-CM

## 2023-09-18 LAB
CHOLEST SERPL-MCNC: 313 MG/DL
HDLC SERPL-MCNC: 72 MG/DL
LDLC SERPL CALC-MCNC: 201 MG/DL
NONHDLC SERPL-MCNC: 241 MG/DL
PSA SERPL DL<=0.01 NG/ML-MCNC: 0.55 NG/ML (ref 0–3.5)
TRIGL SERPL-MCNC: 201 MG/DL
TSH SERPL DL<=0.005 MIU/L-ACNC: 2.8 UIU/ML (ref 0.3–4.2)

## 2023-09-18 PROCEDURE — 80061 LIPID PANEL: CPT | Performed by: FAMILY MEDICINE

## 2023-09-18 PROCEDURE — 84443 ASSAY THYROID STIM HORMONE: CPT | Performed by: FAMILY MEDICINE

## 2023-09-18 PROCEDURE — 99396 PREV VISIT EST AGE 40-64: CPT | Performed by: FAMILY MEDICINE

## 2023-09-18 PROCEDURE — 36415 COLL VENOUS BLD VENIPUNCTURE: CPT | Performed by: FAMILY MEDICINE

## 2023-09-18 PROCEDURE — G0103 PSA SCREENING: HCPCS | Performed by: FAMILY MEDICINE

## 2023-09-18 PROCEDURE — 87389 HIV-1 AG W/HIV-1&-2 AB AG IA: CPT | Performed by: FAMILY MEDICINE

## 2023-09-18 PROCEDURE — 86803 HEPATITIS C AB TEST: CPT | Performed by: FAMILY MEDICINE

## 2023-09-18 ASSESSMENT — PAIN SCALES - GENERAL: PAINLEVEL: NO PAIN (0)

## 2023-09-18 ASSESSMENT — ENCOUNTER SYMPTOMS
HEADACHES: 0
PARESTHESIAS: 0
WEAKNESS: 0
CHILLS: 0
NERVOUS/ANXIOUS: 0
DYSURIA: 0
DIZZINESS: 0
NAUSEA: 0
DIARRHEA: 0
FREQUENCY: 0
HEMATOCHEZIA: 0
COUGH: 0
JOINT SWELLING: 0
SHORTNESS OF BREATH: 0
EYE PAIN: 0
ARTHRALGIAS: 0
HEMATURIA: 0
ABDOMINAL PAIN: 0
CONSTIPATION: 0
SORE THROAT: 0
FEVER: 0
MYALGIAS: 0
PALPITATIONS: 0
HEARTBURN: 0

## 2023-09-18 NOTE — PROGRESS NOTES
SUBJECTIVE:   CC: Bong is an 53 year old who presents for preventative health visit.       9/18/2023     6:51 AM   Additional Questions   Roomed by Renetta Hines CMA     Healthy Habits:     Getting at least 3 servings of Calcium per day:  NO    Bi-annual eye exam:  Yes    Dental care twice a year:  NO    Sleep apnea or symptoms of sleep apnea:  None    Diet:  Regular (no restrictions)    Frequency of exercise:  4-5 days/week    Duration of exercise:  45-60 minutes    Taking medications regularly:  Yes    Additional concerns today:  No    Today's PHQ-2 Score:       9/17/2023     5:02 PM   PHQ-2 ( 1999 [employer])   Q1: Little interest or pleasure in doing things 0   Q2: Feeling down, depressed or hopeless 0   PHQ-2 Score 0   Q1: Little interest or pleasure in doing things Not at all   Q2: Feeling down, depressed or hopeless Not at all   PHQ-2 Score 0     Have you ever done Advance Care Planning? (For example, a Health Directive, POLST, or a discussion with a medical provider or your loved ones about your wishes): No, advance care planning information given to patient to review.  Patient declined advance care planning discussion at this time.    Social History     Tobacco Use    Smoking status: Never    Smokeless tobacco: Former   Substance Use Topics    Alcohol use: Yes     Alcohol/week: 0.0 standard drinks of alcohol     Comment: Occasional             9/17/2023     5:01 PM   Alcohol Use   Prescreen: >3 drinks/day or >7 drinks/week? No       Last PSA: No results found for: PSA    Reviewed orders with patient. Reviewed health maintenance and updated orders accordingly - Yes      Reviewed and updated as needed this visit by clinical staff   Tobacco  Allergies  Meds   Med Hx  Surg Hx  Fam Hx  Soc Hx        Reviewed and updated as needed this visit by Provider                     Review of Systems   Constitutional:  Negative for chills and fever.   HENT:  Negative for congestion, ear pain, hearing loss and sore throat.   "  Eyes:  Negative for pain and visual disturbance.   Respiratory:  Negative for cough and shortness of breath.    Cardiovascular:  Negative for chest pain, palpitations and peripheral edema.   Gastrointestinal:  Negative for abdominal pain, constipation, diarrhea, heartburn, hematochezia and nausea.   Genitourinary:  Negative for dysuria, frequency, genital sores, hematuria, impotence, penile discharge and urgency.   Musculoskeletal:  Negative for arthralgias, joint swelling and myalgias.   Skin:  Negative for rash.   Neurological:  Negative for dizziness, weakness, headaches and paresthesias.   Psychiatric/Behavioral:  Negative for mood changes. The patient is not nervous/anxious.      CONSTITUTIONAL: NEGATIVE for fever, chills, change in weight  INTEGUMENTARY/SKIN: NEGATIVE for worrisome rashes, moles or lesions  EYES: NEGATIVE for vision changes or irritation  ENT: NEGATIVE for ear, mouth and throat problems  RESP: NEGATIVE for significant cough or SOB  CV: NEGATIVE for chest pain, palpitations or peripheral edema  GI: NEGATIVE for nausea, abdominal pain, heartburn, or change in bowel habits   male: negative for dysuria, hematuria, decreased urinary stream, erectile dysfunction, urethral discharge  MUSCULOSKELETAL: NEGATIVE for significant arthralgias or myalgia  NEURO: NEGATIVE for weakness, dizziness or paresthesias  PSYCHIATRIC: NEGATIVE for changes in mood or affect    OBJECTIVE:   /88   Pulse 88   Temp 97  F (36.1  C) (Tympanic)   Resp 14   Ht 1.702 m (5' 7\")   Wt 86.2 kg (190 lb)   SpO2 100%   BMI 29.76 kg/m      Physical Exam  GENERAL: healthy, alert and no distress  NECK: no adenopathy, no asymmetry, masses, or scars and thyroid normal to palpation  RESP: lungs clear to auscultation - no rales, rhonchi or wheezes  CV: regular rate and rhythm, normal S1 S2, no S3 or S4, no murmur, click or rub, no peripheral edema and peripheral pulses strong  ABDOMEN: soft, nontender, no hepatosplenomegaly, " no masses and bowel sounds normal  MS: no gross musculoskeletal defects noted, no edema    ASSESSMENT/PLAN:   (Z00.00) Routine general medical examination at a health care facility  (primary encounter diagnosis)  Plan: REVIEW OF HEALTH MAINTENANCE PROTOCOL ORDERS     (Z11.4) Screening for HIV (human immunodeficiency virus)  Plan: HIV Antigen Antibody Combo    (Z11.59) Need for hepatitis C screening test  Plan: Hepatitis C Screen Reflex to HCV RNA Quant and         Genotype    (Z12.11) Screen for colon cancer  Plan: Colonoscopy Screening  Referral    (C73) Papillary thyroid carcinoma (H)  Plan: US Head Neck Soft Tissue    (Z12.5) Screening for prostate cancer  Plan: PSA, screen      COUNSELING:   Reviewed preventive health counseling, as reflected in patient instructions       Regular exercise       Healthy diet/nutrition       Vision screening       Hearing screening       Colorectal cancer screening       Prostate cancer screening    He reports that he has never smoked. He has quit using smokeless tobacco.            Chuy Mathis MD  [facility]

## 2023-09-20 LAB
HCV AB SERPL QL IA: NONREACTIVE
HIV 1+2 AB+HIV1 P24 AG SERPL QL IA: NONREACTIVE

## 2023-11-09 ENCOUNTER — TELEPHONE (OUTPATIENT)
Dept: FAMILY MEDICINE | Facility: CLINIC | Age: 53
End: 2023-11-09
Payer: COMMERCIAL

## 2023-11-09 NOTE — TELEPHONE ENCOUNTER
Please contact patient.    Letter received from his life insurance.  He needs appointment with endocrinology for follow up of his thyroid cancer.  I have referred him to endocrinology for follow up but he has not scheduled.      We have been unable to schedule the referral after several contact attempts.      If you have any questions or concerns, please contact our office at Dept: [phone].

## 2023-11-09 NOTE — TELEPHONE ENCOUNTER
Called patient and left a message with Dr. Leal direction, and fax life insurance forms back to them stating patient needs to follow up with endocrinology.        Alma Mitchell on 11/9/2023 at 2:14 PM

## 2023-11-13 ENCOUNTER — TELEPHONE (OUTPATIENT)
Dept: ENDOCRINOLOGY | Facility: CLINIC | Age: 53
End: 2023-11-13
Payer: COMMERCIAL

## 2023-11-13 NOTE — TELEPHONE ENCOUNTER
[facility] Call Center    Phone Message    May a detailed message be left on voicemail: yes     Reason for Call: Appointment Intake    Referring Provider Name: Chuy Mathis MD   Diagnosis and/or Symptoms: Papillary Thyroid Cancer    Action Taken: Message routed to:  [facility] endo    Travel Screening: Not Applicable

## 2023-11-13 NOTE — TELEPHONE ENCOUNTER
HX thyroid cancer now post surgical hypothyroid. First available in Endocrine Karyn Mayer RN on 11/13/2023 at 1:30 PM

## 2023-11-14 NOTE — TELEPHONE ENCOUNTER
DX, Referring NOTES: Papillary Thyroid Carcinoma; referred by Dr. Chuy Mathis    For Cancer Patients: Need the original operative and surgical pathology reports and all imaging reports/images related to the disease (includes all thyroid US, nuclear thyroid and total body scans, PET scans, chest CT reports since prior to the diagnosis ).   APPT DATE: 2024   NOTES (FOR ALL VISITS) STATUS DETAILS   OFFICE NOTES from referring provider Internal [facility]:  23 - [facility] OV with Dr. Mathis   OFFICE NOTES from other specialist Internal / Internal [facility]:  20 - PCC OV with Dr. Sebastian Nugent - [place]20, 19 - ENDO OV with Dr. Cannon    [facility]:  19, 19 - ENT OV with Dr. Jonathan Cheng Clinic:  19 - ENDO OV with Dr. Cheng   ED NOTES N/A    OPERATIVE REPORT  (thyroid, pituitary, adrenal, parathyroid)  (All op notes related to diagnoses) Care Everywhere [facility]:  19 - OP Note for RIGHT THYROID LOBECTOMY with Dr. Lennox Castillo   MEDICATION LIST Internal    IMAGING      XR (Chest) Internal [facility]:  16 - XR Chest   ULTRASOUND (HEAD/NECK)  * Include FNAs Internal / Reports Only [facility]:  23 - US Head/Neck  21 - US Head/Neck  21 - US Thyroid  11/3/20 - US Head/Neck  19 - US Head/Neck  19 - US Thyroid  19 - US Head/Neck  19 - Thyroid FNA    [facility]:  19 - US Thyroid FNA  19 - US Thyroid   LABS     DIABETES: HBGA1C, CREATININE, FASTING LIPIDS, MICROALBUMIN URINE, POTASSIUM, TSH, T4    THYROID: TSH, T4, CBC, THYRODLONULIN, TOTAL T3, FREE T4, CALCITONIN, CEA Care Everywhere [facility]:  23 - Lipid  23 - TSH, T4  23 - BMP  22 - Glucose  19 - Creatinine  19 - Potassium   PATHOLOGY REPORTS WITH CASE NUMBER  *Surgical path reports for endocrine organs (ovaries, testes, pancreas, etc) Care Everywhere / Interal   [facility]:  19 - Thyroid Biopsy (Case:  [id number])    [facility]:  19 - Thyroid FNA (Case: [id number])     Records Requested  23    Facility  ENT [facility]ty [facility]  Fax: [phone]   [facility]  Fax: [phone]   Outcome * 23 9:36 AM Faxed req to ENT [facility] and [facility] for records and images to be sent to the clinic. - Nicky    * 23 3:53 PM Records received from [facility] [place] sent to HIM to be scanned into the chart. - Nicky    * 23 3:05 PM The [facility] is closed and no longer has staff working to get the images burned on a disc and sent out. - Nicky    Trackin

## 2023-11-22 ENCOUNTER — HOSPITAL ENCOUNTER (OUTPATIENT)
Dept: ULTRASOUND IMAGING | Facility: HOSPITAL | Age: 53
Discharge: HOME | End: 2023-11-22
Attending: FAMILY MEDICINE | Admitting: FAMILY MEDICINE
Payer: COMMERCIAL

## 2023-11-22 DIAGNOSIS — C73 PAPILLARY THYROID CARCINOMA (H): ICD-10-CM

## 2023-11-22 PROCEDURE — 76536 US EXAM OF HEAD AND NECK: CPT

## 2024-01-08 ENCOUNTER — PRE VISIT (OUTPATIENT)
Dept: ENDOCRINOLOGY | Facility: CLINIC | Age: 54
End: 2024-01-08

## 2024-01-14 DIAGNOSIS — E89.0 POSTSURGICAL HYPOTHYROIDISM: ICD-10-CM

## 2024-01-15 RX ORDER — LEVOTHYROXINE SODIUM 75 UG/1
TABLET ORAL
Qty: 120 TABLET | Refills: 2 | Status: SHIPPED | OUTPATIENT
Start: 2024-01-15 | End: 2024-02-05

## 2024-01-15 NOTE — TELEPHONE ENCOUNTER
DX, Referring NOTES: Papillary Thyroid Carcinoma; referred by Dr. Chuy Mathis    For Cancer Patients: Need the original operative and surgical pathology reports and all imaging reports/images related to the disease (includes all thyroid US, nuclear thyroid and total body scans, PET scans, chest CT reports since prior to the diagnosis ).   APPT DATE: 2024   NOTES (FOR ALL VISITS) STATUS DETAILS   OFFICE NOTES from referring provider [facility]:  23 - PCC OV with Dr. Mathis   OFFICE NOTES from other specialist Received / Internal [facility]:  20 - PCC OV with Dr. Sebastian Nugent - [place]20, 19 - ENDO OV with Dr. Cannon     [facility]:  19, 19 - ENT OV with Dr. Jonathan Cheng Clinic:  19 - ENDO OV with Dr. Cheng   ED NOTES N/A    OPERATIVE REPORT  (thyroid, pituitary, adrenal, parathyroid)  (All op notes related to diagnoses) Care Everywhere [facility]:  19 - OP Note for RIGHT THYROID LOBECTOMY with Dr. Lennox Castillo   MEDICATION LIST Internal    IMAGING      XR (Chest) Internal [facility]:  16 - XR Chest   ULTRASOUND (HEAD/NECK)  * Include FNAs Internal / Reports Only [facility]:  23 - US Head/Neck  21 - US Head/Neck  21 - US Thyroid  11/3/20 - US Head/Neck  19 - US Head/Neck  19 - US Thyroid  19 - US Head/Neck  19 - Thyroid FNA     [facility]:  19 - US Thyroid FNA  19 - US Thyroid   LABS     DIABETES: HBGA1C, CREATININE, FASTING LIPIDS, MICROALBUMIN URINE, POTASSIUM, TSH, T4    THYROID: TSH, T4, CBC, THYRODLONULIN, TOTAL T3, FREE T4, CALCITONIN, CEA Internal [facility]:  23 - Lipid  23 - TSH, T4  23 - BMP  22 - Glucose  19 - Creatinine  19 - Potassium   PATHOLOGY REPORTS WITH CASE NUMBER  *Surgical path reports for endocrine organs (ovaries, testes, pancreas, etc) Care Everywhere / Internal   [facility]:  19 - Thyroid Biopsy (Case: [id number])      [facility]:  6/18/19 - Thyroid FNA (Case: [id number])

## 2024-02-05 ENCOUNTER — PRE VISIT (OUTPATIENT)
Dept: ENDOCRINOLOGY | Facility: CLINIC | Age: 54
End: 2024-02-05

## 2024-02-05 ENCOUNTER — VIRTUAL VISIT (OUTPATIENT)
Dept: ENDOCRINOLOGY | Facility: CLINIC | Age: 54
End: 2024-02-05
Payer: COMMERCIAL

## 2024-02-05 VITALS — HEIGHT: 68 IN | BODY MASS INDEX: 27.28 KG/M2 | WEIGHT: 180 LBS

## 2024-02-05 DIAGNOSIS — C73 PAPILLARY THYROID CARCINOMA (H): ICD-10-CM

## 2024-02-05 DIAGNOSIS — E89.0 POSTSURGICAL HYPOTHYROIDISM: Primary | ICD-10-CM

## 2024-02-05 PROCEDURE — 99417 PROLNG OP E/M EACH 15 MIN: CPT | Mod: 95

## 2024-02-05 PROCEDURE — 99205 OFFICE O/P NEW HI 60 MIN: CPT | Mod: 95

## 2024-02-05 RX ORDER — LEVOTHYROXINE SODIUM 75 UG/1
TABLET ORAL
Qty: 120 TABLET | Refills: 4 | Status: SHIPPED | OUTPATIENT
Start: 2024-02-05 | End: 2024-06-26

## 2024-02-05 ASSESSMENT — PAIN SCALES - GENERAL: PAINLEVEL: NO PAIN (0)

## 2024-02-05 NOTE — NURSING NOTE
Is the patient currently in the state of [place]? YES    Visit mode:VIDEO    If the visit is dropped, the patient can be reconnected by: VIDEO VISIT: Text to cell phone:   Telephone Information:   Mobile [phone]       Will anyone else be joining the visit? NO  (If patient encounters technical issues they should call [phone])    How would you like to obtain your AVS? MyChart    Are changes needed to the allergy or medication list? Pt stated no changes to allergies and Pt stated no med changes  Please remove any meds marked not taking and any flagged for removal.    Reason for visit: Consult    Wt/ht other than 24 hrs:  estimation  Pain more than one location:  no  Charis EARLY

## 2024-02-05 NOTE — LETTER
2/5/2024       RE: Bong Rowley  [address] [place] [address]     Dear Colleague,    Thank you for referring your patient, Bong Rowley, to the [facility]OLOG[facility] [place] at [facility]. Please see a copy of my visit note below.    Endocrine Consult Video visit note-     Attending Assessment/Plan :     Papillary thyroid carcinoma 4.6 cm intrathyroidal , 0/1 LN , s/p lobectomy 2019.  Current status unknown   pT3a, pN0, cMx  MACIS 5.06 assuming no distant mets   KATELIN recurrence risk intermediate ? Unsure   Labs now :Tg, TSH, free T4   Low threshold for CT chest without contrast.  Discussed.     History of right thyroid lobectomy 2019.  On LT4 75  x 9/week (mean 96 mcg/day), Treat to target TSH around 0.4.   Increase to LT4 75 x 9.5/week (mean 102 mcg/day over the course of a week)    Lung nodule on CXR 2016, calcified, suspected granuloma at the time.   Current status unknown  Chest CT recommended by me.  He will think about it.     Due to the COVID 19 pandemic this visit was a video visit in order to help prevent spread of infection in this patient and the general population. The patient gave verbal consent for the visit today. I have independently reviewed and interpreted labs, imaging as indicated.     Distant Location (provider location):  Off-site  Mode of Communication:  Video Conference via [employer]  Chart review/prep time 1  [id number]   Visit Start time  1801   Visit Stop time  1825   _80_ minutes spent on the date of the encounter doing chart review, history and exam, documentation and further activities as noted above.    Pat Arguelles MD    Chief complaint:  Bong is a 53 year old male seen in consultation at the request of Dr Chuy Mathis for papillary thyroid carcinoma  I have reviewed Care Everywhere including [facility], [facility] lab reports, imaging reports and provider notes as indicated.      HISTORY OF  "PRESENT ILLNESS  Bong has previously been seen by Dr Marvin Cannon , most recently 11/11/2020    The thyroid cancer history is as follows:   6/18/19 FNAB right /isthmus thyroid nodule ([facility]):   7/23/2019 right thyroid lobectomy (Dr Trey Castillo, [facility]):  4.6 cm papillary thyroid carcoma, 0/1 lN     He has been on the current LT4 dose of 75 x 9/week for much of the time.      Endocrine relevant labs are as follows:  6/14/19 TSH 3.61  9/9/19 TSH 9.07  11/18/19 TSH 4.47, free t4 1.02 - started on LT4 75 mcg/day after this .  11/3/2020 TSH 0.72  9/18/23 TSH 2.8  1/8/2024 orders placed by me have not been drawn.      Relevant imaging is as follows: (as read by me as it pertains to endocrine relevant organs)  6/9/16 CXR: \"relatively dense nodules left lung, unchanged compared with 12/9/15  , measuring 1 cm adjacent to the major fissure - suspect granuloma given its density with calcification\"  7/5/19 thyroid US   Right isthmus mixed nodule   11/22/23 neck US compared with 4/5/21  Absent right thyroid  Right level 6 # 8 0.4 x 0.4 x 1.7 cm   Normal appearing left thyroid   Level 2 lNs are unremarkable to my eye- this is the LN referred to by the radiologist   Left level 6 # 13  x 0.4 x 1.6   Right level 3 # 4  0.8 x 0.3 x 0.8 cm   Right level 3 # 5 0.7 x 0.4 x 01 cm   Right level 5 high  0.5 x 0.4 x 0.4 cm was  0.6 x 0.5 x 1.5 cm or 0.6 x 0.4 x 0.9 cm   Right level 5 # 7  0.8 x 0.3 x   Left level 3 # 12 0.5 x 0.5 x 1.2 cm ; was 0.5 x 0.4 x 1.8 cm     REVIEW OF SYSTEMS  Feels OK  10 system ROS otherwise as per the HPI or negative    Past Medical History  Past Medical History:   Diagnosis Date    history of right thyroid lobectomy 07/23/2019    Papillary thyroid carcinoma (H) 07/2019     Past Surgical History:   Procedure Laterality Date    COLONOSCOPY  03/02/2011    COLONOSCOPY performed by ADRIANNE ROMAN at [facility]    COLONOSCOPY N/A 05/16/2017    Procedure: COLONOSCOPY;  Colonoscopy;  Surgeon: Edinson Torres" "MD PRISCILLA;  Location: [facility]    none      THYROIDECTOMY, PARTIAL Right 07/23/2019    Dr Jonathan GUERRA       Medications  Current Outpatient Medications   Medication Sig Dispense Refill    levothyroxine (SYNTHROID/LEVOTHROID) 75 MCG tablet TAKE ONE TABLET BY MOUTH DAILY  5 DAYS OF THE WEEK AND TAKE TWO TABLETS BY MOUTH DAILY  2 DAYS OF THE WEEK. 120 tablet 2    multivitamin w/minerals (THERA-VIT-M) tablet Take 1 tablet by mouth daily       He doesn't use a pill tray    Allergies  No Known Allergies    Family History  Family History   Problem Relation Age of Onset    Cancer - colorectal Father     Circulatory Father         Mini strokes    Respiratory Maternal Grandmother         Emphysema    Heart Disease Maternal Grandfather         Heart attack       Social History  Social History     Tobacco Use    Smoking status: Never    Smokeless tobacco: Former   Vaping Use    Vaping Use: Never used   Substance Use Topics    Alcohol use: Yes     Alcohol/week: 0.0 standard drinks of alcohol     Comment: Occasional    Drug use: No     , travels  overseas; ;   At grand son's [employer] class.      Physical Exam  Body mass index is 27.37 kg/m .  BP Readings from Last 1 Encounters:   09/18/23 138/88      Pulse Readings from Last 1 Encounters:   09/18/23 88      Resp Readings from Last 1 Encounters:   09/18/23 14      Temp Readings from Last 1 Encounters:   09/18/23 97  F (36.1  C) (Tympanic)      SpO2 Readings from Last 1 Encounters:   09/18/23 100%      Wt Readings from Last 1 Encounters:   02/05/24 81.6 kg (180 lb)      Ht Readings from Last 1 Encounters:   02/05/24 1.727 m (5' 8\")     GENERAL: no distress; walking outside in the dark ; normal voice   SKIN: Visible skin clear.  EYES: Eyes grossly normal to inspection.    NECK: visible goiter is not present; no visible neck masses  RESP: No audible wheeze, cough, or  increased work of breathing.    NEURO: Awake, alert, responds appropriately to questions.  Mentation and speech " fluent.  PSYCH:affect normal and appearance well-groomed.    DATA REVIEW      Patient Name: NOE ORTIZ  MR#: [id number]  Specimen #: [id number]  Collected: 6/18/2019  Received: 6/18/2019  Reported: 6/19/2019 11:13  Ordering Phy(s): TYLER NIX NORA  SPECIMEN/STAIN PROCESS:  Thyroid, right nodule, ultrasound guided fine needle aspiration       Pap-Cyto x   ---------------------------------------------------------------  CYTOLOGIC INTERPRETATION:  Thyroid, right nodule, ultrasound guided fine needle aspiration:    Positive for malignancy.  Papillary thyroid carcinoma  The [place] implied risk of malignancy and recommended clinical  management:  Positive for malignancy has a 97-99% risk of malignancy, recommended management is near-total thyroidectomy  Specimen Adequacy: Satisfactory for evaluation.  I have personally reviewed all specimens and/or slides, including the  listed special stains, and used them  with my medical judgement to determine or confirm the final diagnosis  Electronically signed out by  Franklyn Pennington M.D  CLINICAL HISTORY  GROSS  Thyroid, right nodule, ultrasound guided fine needle aspiration:  Received are 7 fixed slides, all Pap stained  MICROSCOPIC:  Examination of the thyroid aspirate is remarkable for a cellular aspirate, composed of papillary clusters  comprised of cells exhibiting irregular, enlarged nuclei with distinct nuclear grooves and occasional  intranuclear cytoplasmic pseudoinclusions. Nuclear overlapping is apparent. The morphology is that of  papillary carcinoma  CPT Codes:  A: 42857-SQJ  COLLECTION SITE:  Client:  [facility]  Location:  [facility](  The technical component of this testing was completed at the [facility], with the professional component performed   at the [facility], [address] [place] [phone] ([phone])      [facility]     "          Specimen Collected: 06/18/19  3:08 PM Last Resulted: 06/19/19 11:14 AM           [facility]  Outside Information     PATH TISSUE EXAM  Specimen: Tissue - Right Thyroid  Component 4 yr ago   Case Report Pathology Report                                  Case: [id number]                                  Authorizing Provider:  Lennox Castillo MD   Collected:           07/23/2019 1508              Ordering Location:     [facility]        Received:            07/23/2019 1518                                     [facility]                                                                    Pathologist:           Hernando Wign MD                                                  Specimen:    Right Thyroid, Right Thyroid Lobectomy                                                 Final Diagnosis A) THYROID, RIGHT LOBE, LOBECTOMY:  1. Papillary thyroid carcinoma, 4.6 cm  2. Tumor is confined to the thyroid  3. The surgical margins are negative for tumor  4. Negative for lymphatic/vascular invasion by tumor  5. Background thyroid with no diagnostic abnormality  6. No parathyroid gland identified  7. One lymph node, negative for metastasis (0/1)  8. Please see thyroid cancer staging parameters below   Electronically signed by Hernando Wing MD on 7/24/2019 at 12:17 PM   Clinical Information 50 yo with right thyroid nodule x months. Imaging showed less than a 2 cm nodule on the right lobe/isthmus region with no left-sided pathology noted. FNA positive for malignancy, papillary thyroid carcinoma ([place] outside case [id number]).    Papillary thyroid carcinoma   Gross Description A) Received fresh and subsequently placed in formalin labeled with the patient's name and \"right thyroid lobectomy\" is a 15.6 cm, 5.7 x 3.4 x 2.1 cm red, nodular right thyroid lobe.  The thin, wispy capsule is focally disrupted.    Specimen is inked as " follows:  Anterior-blue  Posterior-black  Isthmus margin-orange    The specimen is serially sectioned from superior to inferior to reveal a 4.6 x 1.8 x 1.8 cm encapsulated, tan-brown, cystic to solid nodule in the lower right lobe that grossly abuts the anterior and posterior surfaces.    The uninvolved parenchyma is red-brown, beefy, and without further discrete mass or lesion.    Representative sections are submitted as follows:  A1: Uninvolved parenchyma  A2-A8: Entire nodule, sequentially submitted from superior to inferior    The specimen was placed in formalin at 1521 on 7/23/2019.  LEF 7/23/2019   Microscopic Description The final diagnosis is based on microscopic examination of appropriate sections of all specimens.   Additional Information Interpreted at [facility]  (Central Lab, [facility], [facility],  [facility], [facility], [facility], [facility])   SYNOPTIC REPORTING THYROID GLAND  (Thyroid - All Specimens  CLINICAL   Clinical History:    No known radiation exposur  SPECIMEN   Procedure:    Right lobectomy  TUMOR   Histologic Type:    Papillary carcinoma, classic (usual, conventional)   Tumor Size in Centimeters (cm):    Greatest dimension in Centimeters (cm): 4.6 Centimeters (cm)   Tumor Site:    Right lobe   Tumor Focality:    Unifocal   Tumor Extent:         Extrathyroidal Extension:    Not identified   Accessory Findings:         Angioinvasion (vascular invasion):    Not identified     Lymphatic Invasion:    Not identified     Perineural Invasion:    Not identified  MARGINS   Margins:    Uninvolved by carcinoma     Distance of Invasive Carcinoma from Closest Margin in Millimeters (mm):    1 Millimeters (mm  LYMPH NODES   Number of Lymph Nodes Involved:    0   Number of Lymph Nodes Examined:    1     Hiro Levels:    Level VI - pretracheal, paratracheal and prelaryngeal / Delphian, perithyroidal (central compartment dissection  PATHOLOGIC  STAGE CLASSIFICATION (pTNM, AJCC 8th Edition)     Primary Tumor (pT):    pT3a   Regional Lymph Nodes (pN):    pN0a    ADDITIONAL FINDINGS   Additional Pathologic Findings:    None identified   Resulting Agency [facility]   Images on Order [id number]    Image Retrieve    This media has not yet been retrieved from an outside organization. To retrieve, click the link below.  Document on 7/23/2019  3:08 PM: PATH TISSUE EXAM        Specimen Collected: 07/23/19  3:08 PM Last Resulted: 07/24/19 12:17 PM       EXAM: US HEAD NECK SOFT TISSUE  LOCATION: [facility]  DATE: 11/22/2023  INDICATION:  Papillary thyroid carcinoma (H). Right thyroidectomy.   COMPARISON: 04/05/2021.   TECHNIQUE: Routine  FINDINGS:  Right thyroidectomy. No new soft tissue in the thyroidectomy bed.   Left thyroid lobe measures 4.3 x 1.8 x 1.4 cm. Normal echotexture with no focal nodule.  Isthmus measures 5  mm. No additional findings.  These are multiple right neck lymph nodes. The largest is 2.2 x 0.7 x 0.6 cm (previously 1.8 x 0.4 x 0.5 cm). A few of these do not have identifiable fatty zac.  There are multiple left neck lymph nodes. The largest is 1.6 x 0.4 x 0.4 cm.                                                   IMPRESSION:  1.  Right thyroidectomy. No new soft tissue in the thyroidectomy bed.  2.  Right and left neck lymph nodes are present. A right neck lymph node is minimally larger. Recommend continued follow-up ultrasound. PET/CT and tissue sampling could be considered.    Virtual Visit Details    Type of service:  Video Visit

## 2024-02-05 NOTE — PROGRESS NOTES
Endocrine Consult Video visit note-     Attending Assessment/Plan :     Papillary thyroid carcinoma 4.6 cm intrathyroidal , 0/1 LN , s/p lobectomy 2019.  Current status unknown   pT3a, pN0, cMx  MACIS 5.06 assuming no distant mets   KATELIN recurrence risk intermediate ? Unsure   Labs now :Tg, TSH, free T4   Low threshold for CT chest without contrast.  Discussed.     History of right thyroid lobectomy 2019.  On LT4 75  x 9/week (mean 96 mcg/day), Treat to target TSH around 0.4.   Increase to LT4 75 x 9.5/week (mean 102 mcg/day over the course of a week)    Lung nodule on CXR 2016, calcified, suspected granuloma at the time.   Current status unknown  Chest CT recommended by me.  He will think about it.     Due to the COVID 19 pandemic this visit was a video visit in order to help prevent spread of infection in this patient and the general population. The patient gave verbal consent for the visit today. I have independently reviewed and interpreted labs, imaging as indicated.     Distant Location (provider location):  Off-site  Mode of Communication:  Video Conference via [employer]  Chart review/prep time 1  [id number]   Visit Start time  1801   Visit Stop time  1825   _80_ minutes spent on the date of the encounter doing chart review, history and exam, documentation and further activities as noted above.    Pat Arguelles MD    Chief complaint:  Bong is a 53 year old male seen in consultation at the request of Dr Chuy Mathis for papillary thyroid carcinoma  I have reviewed Care Everywhere including [facility], [facility] lab reports, imaging reports and provider notes as indicated.      HISTORY OF PRESENT ILLNESS  Bong has previously been seen by Dr Marvin Cannon , most recently 11/11/2020    The thyroid cancer history is as follows:   6/18/19 FNAB right /isthmus thyroid nodule ([facility] clinic):   7/23/2019 right thyroid lobectomy (Dr Trey Castillo, [facility]):  4.6 cm papillary thyroid carcoma, 0/1 lN     He  "has been on the current LT4 dose of 75 x 9/week for much of the time.      Endocrine relevant labs are as follows:  6/14/19 TSH 3.61  9/9/19 TSH 9.07  11/18/19 TSH 4.47, free t4 1.02 - started on LT4 75 mcg/day after this .  11/3/2020 TSH 0.72  9/18/23 TSH 2.8  1/8/2024 orders placed by me have not been drawn.      Relevant imaging is as follows: (as read by me as it pertains to endocrine relevant organs)  6/9/16 CXR: \"relatively dense nodules left lung, unchanged compared with 12/9/15  , measuring 1 cm adjacent to the major fissure - suspect granuloma given its density with calcification\"  7/5/19 thyroid US   Right isthmus mixed nodule   11/22/23 neck US compared with 4/5/21  Absent right thyroid  Right level 6 # 8 0.4 x 0.4 x 1.7 cm   Normal appearing left thyroid   Level 2 lNs are unremarkable to my eye- this is the LN referred to by the radiologist   Left level 6 # 13  x 0.4 x 1.6   Right level 3 # 4  0.8 x 0.3 x 0.8 cm   Right level 3 # 5 0.7 x 0.4 x 01 cm   Right level 5 high  0.5 x 0.4 x 0.4 cm was  0.6 x 0.5 x 1.5 cm or 0.6 x 0.4 x 0.9 cm   Right level 5 # 7  0.8 x 0.3 x   Left level 3 # 12 0.5 x 0.5 x 1.2 cm ; was 0.5 x 0.4 x 1.8 cm     REVIEW OF SYSTEMS  Feels OK  10 system ROS otherwise as per the HPI or negative    Past Medical History  Past Medical History:   Diagnosis Date    history of right thyroid lobectomy 07/23/2019    Papillary thyroid carcinoma (H) 07/2019     Past Surgical History:   Procedure Laterality Date    COLONOSCOPY  03/02/2011    COLONOSCOPY performed by ADRIANNE ROMAN at [facility]    COLONOSCOPY N/A 05/16/2017    Procedure: COLONOSCOPY;  Colonoscopy;  Surgeon: Edinson Torres MD;  Location: [facility]    none      THYROIDECTOMY, PARTIAL Right 07/23/2019    [facility], Dr Castillo       Medications  Current Outpatient Medications   Medication Sig Dispense Refill    levothyroxine (SYNTHROID/LEVOTHROID) 75 MCG tablet TAKE ONE TABLET BY MOUTH DAILY  5 DAYS OF THE WEEK AND TAKE TWO TABLETS BY MOUTH " "DAILY  2 DAYS OF THE WEEK. 120 tablet 2    multivitamin w/minerals (THERA-VIT-M) tablet Take 1 tablet by mouth daily       He doesn't use a pill tray    Allergies  No Known Allergies    Family History  Family History   Problem Relation Age of Onset    Cancer - colorectal Father     Circulatory Father         Mini strokes    Respiratory Maternal Grandmother         Emphysema    Heart Disease Maternal Grandfather         Heart attack       Social History  Social History     Tobacco Use    Smoking status: Never    Smokeless tobacco: Former   Vaping Use    Vaping Use: Never used   Substance Use Topics    Alcohol use: Yes     Alcohol/week: 0.0 standard drinks of alcohol     Comment: Occasional    Drug use: No     , travels  overseas; ;   At grand son's [employer] class.      Physical Exam  Body mass index is 27.37 kg/m .  BP Readings from Last 1 Encounters:   09/18/23 138/88      Pulse Readings from Last 1 Encounters:   09/18/23 88      Resp Readings from Last 1 Encounters:   09/18/23 14      Temp Readings from Last 1 Encounters:   09/18/23 97  F (36.1  C) (Tympanic)      SpO2 Readings from Last 1 Encounters:   09/18/23 100%      Wt Readings from Last 1 Encounters:   02/05/24 81.6 kg (180 lb)      Ht Readings from Last 1 Encounters:   02/05/24 1.727 m (5' 8\")     GENERAL: no distress; walking outside in the dark ; normal voice   SKIN: Visible skin clear.  EYES: Eyes grossly normal to inspection.    NECK: visible goiter is not present; no visible neck masses  RESP: No audible wheeze, cough, or  increased work of breathing.    NEURO: Awake, alert, responds appropriately to questions.  Mentation and speech fluent.  PSYCH:affect normal and appearance well-groomed.    DATA REVIEW      Patient Name: NOE ORTIZ  MR#: [id number]  Specimen #: [id number]  Collected: 6/18/2019  Received: 6/18/2019  Reported: 6/19/2019 11:13  Ordering Phy(s): TYLER MELENDEZ  SPECIMEN/STAIN PROCESS:  Thyroid, right nodule, " ultrasound guided fine needle aspiration       Pap-Cyto x   ---------------------------------------------------------------  CYTOLOGIC INTERPRETATION:  Thyroid, right nodule, ultrasound guided fine needle aspiration:    Positive for malignancy.  Papillary thyroid carcinoma  The [place] implied risk of malignancy and recommended clinical  management:  Positive for malignancy has a 97-99% risk of malignancy, recommended management is near-total thyroidectomy  Specimen Adequacy: Satisfactory for evaluation.  I have personally reviewed all specimens and/or slides, including the  listed special stains, and used them  with my medical judgement to determine or confirm the final diagnosis  Electronically signed out by  Franklyn Pennington M.D  CLINICAL HISTORY  GROSS  Thyroid, right nodule, ultrasound guided fine needle aspiration:  Received are 7 fixed slides, all Pap stained  MICROSCOPIC:  Examination of the thyroid aspirate is remarkable for a cellular aspirate, composed of papillary clusters  comprised of cells exhibiting irregular, enlarged nuclei with distinct nuclear grooves and occasional  intranuclear cytoplasmic pseudoinclusions. Nuclear overlapping is apparent. The morphology is that of  papillary carcinoma  CPT Codes:  A: 56033-LZX  COLLECTION SITE:  Client:  [facility]  Location:  [facility](  The technical component of this testing was completed at the [facility], with the professional component performed   at the [facility]  [facility], [address] [place] [id number] ([phone])      Resulting Agency [facility]              Specimen Collected: 06/18/19  3:08 PM Last Resulted: 06/19/19 11:14 AM           [employer] [facility]  Outside Information     PATH TISSUE EXAM  Specimen: Tissue - Right Thyroid  Component 4 yr ago   Case Report Pathology Report                                  Case: [id number]      "                             Authorizing Provider:  Lennox Castillo MD   Collected:           07/23/2019 1508              Ordering Location:     [facility]        Received:            07/23/2019 [facility]8                                     [facility]                                                                    Pathologist:           Hernando Wing MD                                                  Specimen:    Right Thyroid, Right Thyroid Lobectomy                                                 Final Diagnosis A) THYROID, RIGHT LOBE, LOBECTOMY:  1. Papillary thyroid carcinoma, 4.6 cm  2. Tumor is confined to the thyroid  3. The surgical margins are negative for tumor  4. Negative for lymphatic/vascular invasion by tumor  5. Background thyroid with no diagnostic abnormality  6. No parathyroid gland identified  7. One lymph node, negative for metastasis (0/1)  8. Please see thyroid cancer staging parameters below   Electronically signed by Hernando Wing MD on 7/24/2019 at 12:17 PM   Clinical Information 48 yo with right thyroid nodule x months. Imaging showed less than a 2 cm nodule on the right lobe/isthmus region with no left-sided pathology noted. FNA positive for malignancy, papillary thyroid carcinoma ([facility] case [id number]).    Papillary thyroid carcinoma   Gross Description A) Received fresh and subsequently placed in formalin labeled with the patient's name and \"right thyroid lobectomy\" is a 15.6 cm, 5.7 x 3.4 x 2.1 cm red, nodular right thyroid lobe.  The thin, wispy capsule is focally disrupted.    Specimen is inked as follows:  Anterior-blue  Posterior-black  Isthmus margin-orange    The specimen is serially sectioned from superior to inferior to reveal a 4.6 x 1.8 x 1.8 cm encapsulated, tan-brown, cystic to solid nodule in the lower right lobe that grossly abuts the anterior and posterior surfaces.    The uninvolved parenchyma is red-brown, beefy, and without " further discrete mass or lesion.    Representative sections are submitted as follows:  A1: Uninvolved parenchyma  A2-A8: Entire nodule, sequentially submitted from superior to inferior    The specimen was placed in formalin at 1521 on 7/23/2019.  LEF 7/23/2019   Microscopic Description The final diagnosis is based on microscopic examination of appropriate sections of all specimens.   Additional Information Interpreted at [facility]  (Central Lab, [facility], [facility],  [facility], [facility], [facility], Critical access hospital)   SYNOPTIC REPORTING THYROID GLAND  (Thyroid - All Specimens  CLINICAL   Clinical History:    No known radiation exposur  SPECIMEN   Procedure:    Right lobectomy  TUMOR   Histologic Type:    Papillary carcinoma, classic (usual, conventional)   Tumor Size in Centimeters (cm):    Greatest dimension in Centimeters (cm): 4.6 Centimeters (cm)   Tumor Site:    Right lobe   Tumor Focality:    Unifocal   Tumor Extent:         Extrathyroidal Extension:    Not identified   Accessory Findings:         Angioinvasion (vascular invasion):    Not identified     Lymphatic Invasion:    Not identified     Perineural Invasion:    Not identified  MARGINS   Margins:    Uninvolved by carcinoma     Distance of Invasive Carcinoma from Closest Margin in Millimeters (mm):    1 Millimeters (mm  LYMPH NODES   Number of Lymph Nodes Involved:    0   Number of Lymph Nodes Examined:    1     Hiro Levels:    Level VI - pretracheal, paratracheal and prelaryngeal / Delphian, perithyroidal (central compartment dissection  PATHOLOGIC STAGE CLASSIFICATION (pTNM, AJCC 8th Edition)     Primary Tumor (pT):    pT3a   Regional Lymph Nodes (pN):    pN0a    ADDITIONAL FINDINGS   Additional Pathologic Findings:    None identified   Resulting Agency [facility]   Images on Order [id number]    Image Retrieve    This media has not yet been retrieved from an  outside organization. To retrieve, click the link below.  Document on 7/23/2019  3:08 PM: PATH TISSUE EXAM        Specimen Collected: 07/23/19  3:08 PM Last Resulted: 07/24/19 12:17 PM       EXAM: US HEAD NECK SOFT TISSUE  LOCATION: [facility]  DATE: 11/22/2023  INDICATION:  Papillary thyroid carcinoma (H). Right thyroidectomy.   COMPARISON: 04/05/2021.   TECHNIQUE: Routine  FINDINGS:  Right thyroidectomy. No new soft tissue in the thyroidectomy bed.   Left thyroid lobe measures 4.3 x 1.8 x 1.4 cm. Normal echotexture with no focal nodule.  Isthmus measures 5  mm. No additional findings.  These are multiple right neck lymph nodes. The largest is 2.2 x 0.7 x 0.6 cm (previously 1.8 x 0.4 x 0.5 cm). A few of these do not have identifiable fatty zac.  There are multiple left neck lymph nodes. The largest is 1.6 x 0.4 x 0.4 cm.                                                   IMPRESSION:  1.  Right thyroidectomy. No new soft tissue in the thyroidectomy bed.  2.  Right and left neck lymph nodes are present. A right neck lymph node is minimally larger. Recommend continued follow-up ultrasound. PET/CT and tissue sampling could be considered.

## 2024-02-05 NOTE — PATIENT INSTRUCTIONS
Labs now-- I will send results on [place].      Increase levothyroxine to 75 x 9.5/week, in divided dose, which could be achieved using 75 mcg tablets as follows:   MON to Friday 1.5  tablet/day;  SAT and SUN  1 tablet/day   This dose averages 102 mcg/day over the course of a week    You should have labs to follow up on the change in about 2 months.     We discussed possible chest CT     See me approximately yearly with labs about 2-3 weeks prior

## 2024-02-07 ENCOUNTER — MYC MEDICAL ADVICE (OUTPATIENT)
Dept: ENDOCRINOLOGY | Facility: CLINIC | Age: 54
End: 2024-02-07
Payer: COMMERCIAL

## 2024-02-07 NOTE — TELEPHONE ENCOUNTER
Left Voicemail (1st Attempt) and Sent Mychart (1st Attempt) for the patient to call back and schedule the following:    Appointment type: Return thyroid cancer  Provider: Kindra  Return date: one year (around 2/5/25)  Specialty phone number: [phone]  Additional appointment(s) needed: Labs now Labs in 2 months Labs 2-3 weeks before next appt in about one year   Additonal Notes: NA

## 2024-02-12 ENCOUNTER — ANESTHESIA EVENT (OUTPATIENT)
Dept: GASTROENTEROLOGY | Facility: CLINIC | Age: 54
End: 2024-02-12
Payer: COMMERCIAL

## 2024-02-12 ENCOUNTER — MYC MEDICAL ADVICE (OUTPATIENT)
Dept: ENDOCRINOLOGY | Facility: CLINIC | Age: 54
End: 2024-02-12
Payer: COMMERCIAL

## 2024-02-12 RX ORDER — FENTANYL CITRATE 50 UG/ML
25 INJECTION, SOLUTION INTRAMUSCULAR; INTRAVENOUS
Status: CANCELLED | OUTPATIENT
Start: 2024-02-12

## 2024-02-12 RX ORDER — ONDANSETRON 4 MG/1
4 TABLET, ORALLY DISINTEGRATING ORAL EVERY 30 MIN PRN
Status: CANCELLED | OUTPATIENT
Start: 2024-02-12

## 2024-02-12 RX ORDER — ONDANSETRON 2 MG/ML
4 INJECTION INTRAMUSCULAR; INTRAVENOUS EVERY 30 MIN PRN
Status: CANCELLED | OUTPATIENT
Start: 2024-02-12

## 2024-02-12 RX ORDER — OXYCODONE HYDROCHLORIDE 5 MG/1
10 TABLET ORAL
Status: CANCELLED | OUTPATIENT
Start: 2024-02-12

## 2024-02-12 RX ORDER — OXYCODONE HYDROCHLORIDE 5 MG/1
5 TABLET ORAL
Status: CANCELLED | OUTPATIENT
Start: 2024-02-12

## 2024-02-12 RX ORDER — DEXAMETHASONE SODIUM PHOSPHATE 4 MG/ML
4 INJECTION, SOLUTION INTRA-ARTICULAR; INTRALESIONAL; INTRAMUSCULAR; INTRAVENOUS; SOFT TISSUE
Status: CANCELLED | OUTPATIENT
Start: 2024-02-12

## 2024-02-12 NOTE — TELEPHONE ENCOUNTER
Left Voicemail (2nd Attempt) and Sent Mychart (2nd Attempt) for the patient to call back and schedule the following:     Appointment type: Return thyroid cancer  Provider: Kindra  Return date: one year (around 2/5/25)  Specialty phone number: [phone]  Additional appointment(s) needed: Labs now Labs in 2 months Labs 2-3 weeks before next appt in about one year   Additonal Notes: NA

## 2024-02-12 NOTE — ANESTHESIA PREPROCEDURE EVALUATION
Anesthesia Pre-Procedure Evaluation    Patient: Bong Rowley   MRN: [id number] : 1970        Procedure : Procedure(s):  Colonoscopy          Past Medical History:   Diagnosis Date    history of right thyroid lobectomy 2019    Papillary thyroid carcinoma (H) 2019      Past Surgical History:   Procedure Laterality Date    COLONOSCOPY  2011    COLONOSCOPY performed by ADRIANNE ROMAN at [facility]    COLONOSCOPY N/A 2017    Procedure: COLONOSCOPY;  Colonoscopy;  Surgeon: Edinson Torres MD;  Location: [facility]    none      THYROIDECTOMY, PARTIAL Right 2019    Dr Jonathan GUERRA      No Known Allergies   Social History     Tobacco Use    Smoking status: Never    Smokeless tobacco: Former   Substance Use Topics    Alcohol use: Yes     Alcohol/week: 0.0 standard drinks of alcohol     Comment: Occasional      Wt Readings from Last 1 Encounters:   24 81.6 kg (180 lb)        Anesthesia Evaluation   Pt has had prior anesthetic. Type: General and MAC.        ROS/MED HX  ENT/Pulmonary:       Neurologic:       Cardiovascular:  - neg cardiovascular ROS     METS/Exercise Tolerance:     Hematologic:  - neg hematologic  ROS     Musculoskeletal:       GI/Hepatic:  - neg GI/hepatic ROS     Renal/Genitourinary:       Endo:     (+)          thyroid problem, Thyroid disease - Other partial thyroidectomy for thyroid cancer,           Psychiatric/Substance Use:       Infectious Disease:       Malignancy:       Other:          Physical Exam    Airway        Mallampati: I   TM distance: > 3 FB   Neck ROM: full   Mouth opening: > 3 cm    Respiratory Devices and Support         Dental       (+) Minor Abnormalities - some fillings, tiny chips      Cardiovascular   cardiovascular exam normal          Pulmonary   pulmonary exam normal                OUTSIDE LABS:  CBC:   Lab Results   Component Value Date    WBC 6.3 2019    WBC 7.6 2011    HGB 15.5 2019    HGB 16.4 2011    HCT 45.2  "07/05/2019    HCT 47.5 02/23/2011     07/05/2019     02/23/2011     BMP:   Lab Results   Component Value Date     06/05/2023    POTASSIUM 5.1 06/05/2023    POTASSIUM 4.6 09/09/2019    CHLORIDE 103 06/05/2023    CO2 30 (H) 06/05/2023    BUN 8.9 06/05/2023    CR 0.97 06/05/2023    CR 0.99 09/09/2019     (H) 06/05/2023     (H) 04/22/2022     COAGS: No results found for: \"PTT\", \"INR\", \"FIBR\"  POC: No results found for: \"BGM\", \"HCG\", \"HCGS\"  HEPATIC:   Lab Results   Component Value Date    ALT 19 09/09/2019    AST 21 09/09/2019     OTHER:   Lab Results   Component Value Date    EPIFANIO 9.7 06/05/2023    TSH 2.80 09/18/2023    T4 1.02 11/18/2019       Anesthesia Plan    ASA Status:  2    NPO Status:  NPO Appropriate    Anesthesia Type: General.     - Airway: Native airway   Induction: Intravenous, Propofol.   Maintenance: TIVA.        Consents    Anesthesia Plan(s) and associated risks, benefits, and realistic alternatives discussed. Questions answered and patient/representative(s) expressed understanding.     - Discussed:     - Discussed with:  Patient      - Extended Intubation/Ventilatory Support Discussed: No.      - Patient is DNR/DNI Status: No     Use of blood products discussed: No .     Postoperative Care    Pain management: Multi-modal analgesia.   PONV prophylaxis: Background Propofol Infusion     Comments:               TOMMY Baum CRNA    I have reviewed the pertinent notes and labs in the chart from the past 30 days and (re)examined the patient.  Any updates or changes from those notes are reflected in this note.              # Overweight: Estimated body mass index is 27.37 kg/m  as calculated from the following:    Height as of 2/5/24: 1.727 m (5' 8\").    Weight as of 2/5/24: 81.6 kg (180 lb).      "

## 2024-02-14 ENCOUNTER — ANESTHESIA (OUTPATIENT)
Dept: GASTROENTEROLOGY | Facility: CLINIC | Age: 54
End: 2024-02-14
Payer: COMMERCIAL

## 2024-02-14 ENCOUNTER — HOSPITAL ENCOUNTER (OUTPATIENT)
Facility: CLINIC | Age: 54
Discharge: HOME | End: 2024-02-14
Attending: SURGERY | Admitting: SURGERY
Payer: COMMERCIAL

## 2024-02-14 VITALS
WEIGHT: 180 LBS | HEART RATE: 73 BPM | SYSTOLIC BLOOD PRESSURE: 117 MMHG | RESPIRATION RATE: 14 BRPM | TEMPERATURE: 97.3 F | OXYGEN SATURATION: 93 % | HEIGHT: 68 IN | BODY MASS INDEX: 27.28 KG/M2 | DIASTOLIC BLOOD PRESSURE: 93 MMHG

## 2024-02-14 LAB — COLONOSCOPY: NORMAL

## 2024-02-14 PROCEDURE — 250N000011 HC RX IP 250 OP 636

## 2024-02-14 PROCEDURE — 88305 TISSUE EXAM BY PATHOLOGIST: CPT | Mod: TC | Performed by: SURGERY

## 2024-02-14 PROCEDURE — 258N000003 HC RX IP 258 OP 636

## 2024-02-14 PROCEDURE — 250N000009 HC RX 250: Performed by: NURSE ANESTHETIST, CERTIFIED REGISTERED

## 2024-02-14 PROCEDURE — 45380 COLONOSCOPY AND BIOPSY: CPT | Performed by: SURGERY

## 2024-02-14 PROCEDURE — 370N000017 HC ANESTHESIA TECHNICAL FEE, PER MIN: Performed by: SURGERY

## 2024-02-14 PROCEDURE — 258N000003 HC RX IP 258 OP 636: Performed by: NURSE ANESTHETIST, CERTIFIED REGISTERED

## 2024-02-14 PROCEDURE — 250N000009 HC RX 250

## 2024-02-14 RX ORDER — NALOXONE HYDROCHLORIDE 0.4 MG/ML
0.4 INJECTION, SOLUTION INTRAMUSCULAR; INTRAVENOUS; SUBCUTANEOUS
Status: CANCELLED | OUTPATIENT
Start: 2024-02-14

## 2024-02-14 RX ORDER — SODIUM CHLORIDE, SODIUM LACTATE, POTASSIUM CHLORIDE, CALCIUM CHLORIDE 600; 310; 30; 20 MG/100ML; MG/100ML; MG/100ML; MG/100ML
INJECTION, SOLUTION INTRAVENOUS CONTINUOUS
Status: DISCONTINUED | OUTPATIENT
Start: 2024-02-14 | End: 2024-02-14 | Stop reason: HOSPADM

## 2024-02-14 RX ORDER — LIDOCAINE HYDROCHLORIDE 20 MG/ML
INJECTION, SOLUTION INFILTRATION; PERINEURAL PRN
Status: DISCONTINUED | OUTPATIENT
Start: 2024-02-14 | End: 2024-02-14

## 2024-02-14 RX ORDER — LIDOCAINE 40 MG/G
CREAM TOPICAL
Status: DISCONTINUED | OUTPATIENT
Start: 2024-02-14 | End: 2024-02-14 | Stop reason: HOSPADM

## 2024-02-14 RX ORDER — FLUMAZENIL 0.1 MG/ML
0.2 INJECTION, SOLUTION INTRAVENOUS
Status: CANCELLED | OUTPATIENT
Start: 2024-02-14 | End: 2024-02-14

## 2024-02-14 RX ORDER — SODIUM CHLORIDE, SODIUM LACTATE, POTASSIUM CHLORIDE, CALCIUM CHLORIDE 600; 310; 30; 20 MG/100ML; MG/100ML; MG/100ML; MG/100ML
INJECTION, SOLUTION INTRAVENOUS CONTINUOUS PRN
Status: DISCONTINUED | OUTPATIENT
Start: 2024-02-14 | End: 2024-02-14

## 2024-02-14 RX ORDER — PROPOFOL 10 MG/ML
INJECTION, EMULSION INTRAVENOUS PRN
Status: DISCONTINUED | OUTPATIENT
Start: 2024-02-14 | End: 2024-02-14

## 2024-02-14 RX ORDER — PROPOFOL 10 MG/ML
INJECTION, EMULSION INTRAVENOUS CONTINUOUS PRN
Status: DISCONTINUED | OUTPATIENT
Start: 2024-02-14 | End: 2024-02-14

## 2024-02-14 RX ORDER — NALOXONE HYDROCHLORIDE 0.4 MG/ML
0.2 INJECTION, SOLUTION INTRAMUSCULAR; INTRAVENOUS; SUBCUTANEOUS
Status: CANCELLED | OUTPATIENT
Start: 2024-02-14

## 2024-02-14 RX ADMIN — PROPOFOL 100 MG: 10 INJECTION, EMULSION INTRAVENOUS at 08:12

## 2024-02-14 RX ADMIN — LIDOCAINE HYDROCHLORIDE 0.1 ML: 10 INJECTION, SOLUTION EPIDURAL; INFILTRATION; INTRACAUDAL; PERINEURAL at 07:22

## 2024-02-14 RX ADMIN — PROPOFOL 200 MCG/KG/MIN: 10 INJECTION, EMULSION INTRAVENOUS at 08:12

## 2024-02-14 RX ADMIN — LIDOCAINE HYDROCHLORIDE 50 MG: 20 INJECTION, SOLUTION INFILTRATION; PERINEURAL at 08:12

## 2024-02-14 RX ADMIN — SODIUM CHLORIDE, POTASSIUM CHLORIDE, SODIUM LACTATE AND CALCIUM CHLORIDE 1000 ML: 600; 310; 30; 20 INJECTION, SOLUTION INTRAVENOUS at 07:22

## 2024-02-14 RX ADMIN — SODIUM CHLORIDE, POTASSIUM CHLORIDE, SODIUM LACTATE AND CALCIUM CHLORIDE: 600; 310; 30; 20 INJECTION, SOLUTION INTRAVENOUS at 08:11

## 2024-02-14 ASSESSMENT — ACTIVITIES OF DAILY LIVING (ADL)
ADLS_ACUITY_SCORE: 35
ADLS_ACUITY_SCORE: 35

## 2024-02-14 NOTE — H&P
[facility]    Pre-Endoscopy History and Physical     Bong Rowley MRN# [id number]   YOB: 1970 Age: 53 year old     Date of Procedure: 2/14/2024  Primary care provider: No Ref-Primary, Physician  Type of Endoscopy: Colonoscopy with possible biopsy, possible polypectomy  Reason for Procedure: family history  Type of Anesthesia Anticipated: Conscious Sedation    HPI:    Bong is a 53 year old male who will be undergoing the above procedure.      Last colonoscopy 2017, negative.  Father had colon cancer at age 60.  Denies blood in stool or change in stool size.    A history and physical has been performed. The patient's medications and allergies have been reviewed. The risks and benefits of the procedure and the sedation options and risks were discussed with the patient.  All questions were answered and informed consent was obtained.      He denies a personal or family history of anesthesia complications or bleeding disorders.     Patient Active Problem List   Diagnosis    CARDIOVASCULAR SCREENING; LDL GOAL LESS THAN 160    Papillary thyroid carcinoma (H)    H/O partial thyroidectomy    Postsurgical hypothyroidism        Past Medical History:   Diagnosis Date    history of right thyroid lobectomy 07/23/2019    Papillary thyroid carcinoma (H) 07/2019        Past Surgical History:   Procedure Laterality Date    COLONOSCOPY  03/02/2011    COLONOSCOPY performed by ADRIANNE ROMAN at [facility]    COLONOSCOPY N/A 05/16/2017    Procedure: COLONOSCOPY;  Colonoscopy;  Surgeon: Edinson Torres MD;  Location: [facility]    none      THYROIDECTOMY, PARTIAL Right 07/23/2019    Dr Jonathan GUERRA       Social History     Tobacco Use    Smoking status: Never    Smokeless tobacco: Former   Substance Use Topics    Alcohol use: Yes     Alcohol/week: 0.0 standard drinks of alcohol     Comment: Occasional       Family History   Problem Relation Age of Onset    Cancer - colorectal Father     Circulatory Father  "        Mini strokes    Respiratory Maternal Grandmother         Emphysema    Heart Disease Maternal Grandfather         Heart attack       Prior to Admission medications    Medication Sig Start Date End Date Taking? Authorizing Provider   levothyroxine (SYNTHROID/LEVOTHROID) 75 MCG tablet MON to Friday 1.5  tablet/day; SAT and SUN  1 tablet/day 2/5/24  Yes Pat Arguelles MD   multivitamin w/minerals (THERA-VIT-M) tablet Take 1 tablet by mouth daily   Yes Reported, Patient       No Known Allergies     REVIEW OF SYSTEMS:   5 point ROS negative except as noted above in HPI, including Gen., Resp., CV, GI &  system review.    PHYSICAL EXAM:   /85   Pulse 96   Temp 98.3  F (36.8  C)   Resp 18   Ht 1.727 m (5' 8\")   Wt 81.6 kg (180 lb)   SpO2 98%   BMI 27.37 kg/m   Estimated body mass index is 27.37 kg/m  as calculated from the following:    Height as of this encounter: 1.727 m (5' 8\").    Weight as of this encounter: 81.6 kg (180 lb).   Constitutional: Awake, alert, no acute distress.  Eyes: No scleral icterus.  Conjunctiva are without injection.  ENMT: Mucous membranes moist, dentition and gums are intact.   Neck: Soft, supple, trachea midline.    Endocrine: n/a   Lymphatic: There is no cervical, submandibularadenopathy.  Respiratory: normal effortgs   Cardiovascular: S1, S2  Abdomen: Non-distended, non-tender,  No masses,  Musculoskeletal: No spinal or CVA tenderness. Full range of motion in the upper and lower extremities.    Skin: No skin rashes or lesions to inspection.  No petechia.    Neurologic: alerted and oriented 3x  Psychiatric: The patient's affect is not blunted and mood is appropriate.  DIAGNOSTICS:    Not indicated    IMPRESSION   ASA Class 2 - Mild systemic disease    PLAN:   Plan for Colonoscopy with possible biopsy, possible polypectomy. We discussed the risks, benefits and alternatives and the patient wished to proceed.  Patient is cleared for the above procedure.    The above has " been forwarded to the consulting provider.    Jones Lino DO  [facility]

## 2024-02-14 NOTE — ANESTHESIA CARE TRANSFER NOTE
Patient: Bong Rowley    Procedure: Procedure(s):  COLONOSCOPY, WITH POLYPECTOMY AND BIOPSY       Diagnosis: Screen for colon cancer [Z12.11]  Diagnosis Additional Information: No value filed.    Anesthesia Type:   General     Note:    Oropharynx: spontaneously breathing and oropharynx clear of all foreign objects  Level of Consciousness: drowsy  Oxygen Supplementation: room air    Independent Airway: airway patency satisfactory and stable  Dentition: dentition unchanged  Vital Signs Stable: post-procedure vital signs reviewed and stable  Report to RN Given: handoff report given  Patient transferred to: Phase II    Handoff Report: Identifed the Patient, Identified the Reponsible Provider, Reviewed the pertinent medical history, Discussed the surgical course, Reviewed Intra-OP anesthesia mangement and issues during anesthesia, Set expectations for post-procedure period and Allowed opportunity for questions and acknowledgement of understanding      Vitals:  Vitals Value Taken Time   /93 02/14/24 0920   Temp 36.3  C (97.3  F) 02/14/24 0838   Pulse 73 02/14/24 0920   Resp 14 02/14/24 0910   SpO2 93 % 02/14/24 0901   Vitals shown include unfiled device data.    Electronically Signed By: TOMMY Bassett CRNA  February 14, 2024  9:42 AM

## 2024-02-14 NOTE — ANESTHESIA POSTPROCEDURE EVALUATION
Patient: Bong Rowley    Procedure: Procedure(s):  COLONOSCOPY, WITH POLYPECTOMY AND BIOPSY       Anesthesia Type:  General    Note:  Disposition: Outpatient   Postop Pain Control:             Sign Out: ONGOING pain issues   PONV: No   Neuro/Psych: Uneventful            Sign Out: Acceptable/Baseline neuro status   Airway/Respiratory: Uneventful            Sign Out: Acceptable/Baseline resp. status   CV/Hemodynamics: Uneventful            Sign Out: Acceptable CV status; No obvious hypovolemia; No obvious fluid overload   Other NRE: NONE   DID A NON-ROUTINE EVENT OCCUR? No           Last vitals:  Vitals Value Taken Time   /93 02/14/24 0920   Temp 36.3  C (97.3  F) 02/14/24 0838   Pulse 73 02/14/24 0920   Resp 14 02/14/24 0910   SpO2 93 % 02/14/24 0901   Vitals shown include unfiled device data.    Electronically Signed By: TOMMY Bassett CRNA  February 14, 2024  9:42 AM

## 2024-02-14 NOTE — LETTER
Bong Rowley  [address] [place] [address]      February 21, 2024    Dear Bong,  This letter is written to inform you of the results of your recent colonoscopy.  Your examination showed polyp(s) in your rectum. All polyps were removed in their entirety and sent for review by a pathologist. As you will see on the pathology report below, the tissue(s) were hyperplastic polyps. Your examination was otherwise without abnormality.    Hyperplastic polyps are entirely benign (non-cancerous) and rarely associated with the development of additional polyps or colorectal cancer.    Final Diagnosis   Large intestine, rectum, polyp (per operative note), biopsy/polypectomy:  - Hyperplastic polyp negative for dysplasia and malignancy.        Given these findings, your family history of colon cancer, I recommend that you undergo a repeat colonoscopy in five years for screening. We will enter you into a recall system so you receive a reminder closer to the time that you are due for repeat examination.     Please remember that this recommendation is made with the understanding that you are not experiencing persistent changes in bowel function, bleeding per rectum, and/or significant abdominal pain. If you experience these symptoms, please contact your primary care provider for a further evaluation.     If you have any questions or concerns about the results of your colonoscopy or the appropriate follow-up, please contact my assistant at [phone]    Sincerely,      Jones Lino,    [facility]  ___

## 2024-02-15 LAB
PATH REPORT.COMMENTS IMP SPEC: NORMAL
PATH REPORT.COMMENTS IMP SPEC: NORMAL
PATH REPORT.FINAL DX SPEC: NORMAL
PATH REPORT.GROSS SPEC: NORMAL
PATH REPORT.MICROSCOPIC SPEC OTHER STN: NORMAL
PATH REPORT.RELEVANT HX SPEC: NORMAL
PHOTO IMAGE: NORMAL

## 2024-02-15 PROCEDURE — 88305 TISSUE EXAM BY PATHOLOGIST: CPT | Mod: 26 | Performed by: PATHOLOGY

## 2024-04-03 ENCOUNTER — TELEPHONE (OUTPATIENT)
Dept: ENDOCRINOLOGY | Facility: CLINIC | Age: 54
End: 2024-04-03
Payer: COMMERCIAL

## 2024-04-03 NOTE — TELEPHONE ENCOUNTER
Left Voicemail (2nd Attempt) and Sent Mychart (2nd Attempt) for the patient to call back and schedule the following:     Appointment type: Return thyroid cancer  Provider: Kindra  Return date: one year (around 2/5/25)  Specialty phone number: [phone]  Additional appointment(s) needed: Labs now Labs in 2 months Labs 2-3 weeks before next appt in about one year   Additonal Notes: NA          KF

## 2024-05-14 ENCOUNTER — TELEPHONE (OUTPATIENT)
Dept: ENDOCRINOLOGY | Facility: CLINIC | Age: 54
End: 2024-05-14
Payer: COMMERCIAL

## 2024-05-14 NOTE — TELEPHONE ENCOUNTER
Per clinic leadership, if patient would like to switch providers for a second opinion, they are able to.     Please note that this is a one-time exception for switching providers.    Thank you,  Preethi House RN  Endocrinology Service

## 2024-06-26 ENCOUNTER — MYC REFILL (OUTPATIENT)
Dept: ENDOCRINOLOGY | Facility: CLINIC | Age: 54
End: 2024-06-26
Payer: COMMERCIAL

## 2024-06-26 DIAGNOSIS — E89.0 POSTSURGICAL HYPOTHYROIDISM: ICD-10-CM

## 2024-06-26 RX ORDER — LEVOTHYROXINE SODIUM 75 UG/1
TABLET ORAL
Qty: 120 TABLET | Refills: 4 | Status: SHIPPED | OUTPATIENT
Start: 2024-06-26

## 2024-11-10 ENCOUNTER — HEALTH MAINTENANCE LETTER (OUTPATIENT)
Age: 54
End: 2024-11-10

## 2025-03-04 ENCOUNTER — OFFICE VISIT (OUTPATIENT)
Dept: FAMILY MEDICINE | Facility: CLINIC | Age: 55
End: 2025-03-04
Payer: COMMERCIAL

## 2025-03-04 ENCOUNTER — ANCILLARY PROCEDURE (OUTPATIENT)
Dept: GENERAL RADIOLOGY | Facility: CLINIC | Age: 55
End: 2025-03-04
Attending: NURSE PRACTITIONER
Payer: COMMERCIAL

## 2025-03-04 ENCOUNTER — NURSE TRIAGE (OUTPATIENT)
Dept: FAMILY MEDICINE | Facility: CLINIC | Age: 55
End: 2025-03-04

## 2025-03-04 VITALS
HEART RATE: 91 BPM | DIASTOLIC BLOOD PRESSURE: 90 MMHG | TEMPERATURE: 97.9 F | WEIGHT: 180 LBS | HEIGHT: 67 IN | SYSTOLIC BLOOD PRESSURE: 140 MMHG | RESPIRATION RATE: 16 BRPM | OXYGEN SATURATION: 97 % | BODY MASS INDEX: 28.25 KG/M2

## 2025-03-04 DIAGNOSIS — R05.1 ACUTE COUGH: ICD-10-CM

## 2025-03-04 DIAGNOSIS — J01.90 ACUTE NON-RECURRENT SINUSITIS, UNSPECIFIED LOCATION: ICD-10-CM

## 2025-03-04 DIAGNOSIS — J20.9 ACUTE BRONCHITIS, UNSPECIFIED ORGANISM: Primary | ICD-10-CM

## 2025-03-04 PROCEDURE — 71046 X-RAY EXAM CHEST 2 VIEWS: CPT | Mod: TC | Performed by: RADIOLOGY

## 2025-03-04 PROCEDURE — 3077F SYST BP >= 140 MM HG: CPT | Performed by: NURSE PRACTITIONER

## 2025-03-04 PROCEDURE — 1126F AMNT PAIN NOTED NONE PRSNT: CPT | Performed by: NURSE PRACTITIONER

## 2025-03-04 PROCEDURE — 3080F DIAST BP >= 90 MM HG: CPT | Performed by: NURSE PRACTITIONER

## 2025-03-04 PROCEDURE — 99213 OFFICE O/P EST LOW 20 MIN: CPT | Performed by: NURSE PRACTITIONER

## 2025-03-04 RX ORDER — BENZONATATE 200 MG/1
200 CAPSULE ORAL 3 TIMES DAILY PRN
Qty: 30 CAPSULE | Refills: 1 | Status: SHIPPED | OUTPATIENT
Start: 2025-03-04

## 2025-03-04 RX ORDER — AZITHROMYCIN 250 MG/1
TABLET, FILM COATED ORAL
Qty: 6 TABLET | Refills: 0 | Status: SHIPPED | OUTPATIENT
Start: 2025-03-04 | End: 2025-03-09

## 2025-03-04 RX ORDER — ALBUTEROL SULFATE 90 UG/1
2 INHALANT RESPIRATORY (INHALATION) EVERY 4 HOURS PRN
Qty: 18 G | Refills: 1 | Status: SHIPPED | OUTPATIENT
Start: 2025-03-04

## 2025-03-04 ASSESSMENT — PAIN SCALES - GENERAL: PAINLEVEL_OUTOF10: NO PAIN (0)

## 2025-03-04 ASSESSMENT — ENCOUNTER SYMPTOMS: COUGH: 1

## 2025-03-04 NOTE — PATIENT INSTRUCTIONS
Use tessalon pearls for cough as well as albuterol every 4 hours as needed.  If no improvement for the next 3 days, start Zpak.  Follow-up if any worsening symptoms despite treatment.  CXR looks normal and no pneumonia or abnormalities.  Handout given on bronchitis and sinusitis for home therapies.

## 2025-03-04 NOTE — PROGRESS NOTES
Assessment & Plan     Acute bronchitis, unspecified organism  Chest x-ray completed today and appears .  Handout given on normal.  Treating conservatively with albuterol and Tessalon Perles as needed for cough.  I did order azithromycin so if symptoms start to worsen over the next few days instead of continuing to improve he can start taking this since he will be leaving for work as a  again next week out of the country.  Handout given on bronchitis and symptom management.  Advise follow-up if not seeing any improvement despite conservative treatment or if taking antibiotic treatment with no improvement.  - albuterol (PROAIR HFA/PROVENTIL HFA/VENTOLIN HFA) 108 (90 Base) MCG/ACT inhaler; Inhale 2 puffs into the lungs every 4 hours as needed for shortness of breath, wheezing or cough.  - azithromycin (ZITHROMAX) 250 MG tablet; Take 2 tablets (500 mg) by mouth daily for 1 day, THEN 1 tablet (250 mg) daily for 4 days.    Acute non-recurrent sinusitis, unspecified location  Patient likely has a little bit of a sinus cold as well.  Unlikely that this is bacterial.  I did discuss with patient that if he does take the Z-Pop that this would likely help those symptoms if he did turn bacterial as well.  Also advised using saline spray in the nose to loosen up secretions.    Acute cough  - XR Chest 2 Views; Future  - albuterol (PROAIR HFA/PROVENTIL HFA/VENTOLIN HFA) 108 (90 Base) MCG/ACT inhaler; Inhale 2 puffs into the lungs every 4 hours as needed for shortness of breath, wheezing or cough.  - benzonatate (TESSALON) 200 MG capsule; Take 1 capsule (200 mg) by mouth 3 times daily as needed for cough.    See Patient Instructions    Isai Woody is a 54 year old, presenting for the following health issues:  Cough        3/4/2025     2:30 PM   Additional Questions   Roomed by rmb   Accompanied by self         3/4/2025     2:30 PM   Patient Reported Additional Medications   Patient reports taking the following new  "medications none     Cough        Acute Illness  Acute illness concerns: cough,sore throat from coughing   Onset/Duration: Thursday night   Symptoms:  Fever: No  Chills/Sweats: YES  Headache (location?): YES  Sinus Pressure: YES  Conjunctivitis:  YES  Ear Pain: no  Rhinorrhea: No  Congestion: YES  Sore Throat: YES- coughing  Cough: YES-non-productive  Wheeze: No  Decreased Appetite: YES, eating chicken noodle soup, jucie plus shakes, vitamins, fruits and veggies  Nausea: No  Vomiting: No  Diarrhea: YES, started today with 2 episodes of diarrhea  Dysuria/Freq.: No  Dysuria or Hematuria: No  Fatigue/Achiness: No  Sick/Strep Exposure: YES- came back from [place] recently  Therapies tried and outcome: None  No COVID 19 testing done at home.    Review of Systems  CONSTITUTIONAL: NEGATIVE for fever, chills, change in weight  ENT/MOUTH: POSITIVE for hoarse voice, nasal congestion, and postnasal drainage  RESP:POSITIVE for cough-non productive  CV: NEGATIVE for chest pain, palpitations or peripheral edema  PSYCHIATRIC: NEGATIVE for changes in mood or affect  ROS otherwise negative      Objective    BP (!) 140/90   Pulse 91   Temp 97.9  F (36.6  C) (Tympanic)   Resp 16   Ht 1.702 m (5' 7\")   Wt 81.6 kg (180 lb)   SpO2 97%   BMI 28.19 kg/m    Body mass index is 28.19 kg/m .  Physical Exam   GENERAL: alert and no distress  HENT: ear canals and TM's normal, nose and mouth without ulcers or lesions  NECK: no adenopathy, no asymmetry, masses, or scars  RESP: lungs clear to auscultation - no rales, rhonchi or wheezes  CV: regular rate and rhythm, normal S1 S2, no S3 or S4, no murmur, click or rub, no peripheral edema  PSYCH: mentation appears normal, affect normal/bright    Results for orders placed or performed in visit on 03/04/25   XR Chest 2 Views     Status: None    Narrative    EXAM: XR CHEST 2 VIEWS  LOCATION: [facility]  DATE: 3/4/2025    INDICATION:  Acute cough  COMPARISON: June 9, " 2016      Impression    IMPRESSION: Granuloma in the left upper lobe as well as probable granuloma in the lower right lung are unchanged. No pleural effusion, pneumothorax, or abnormal area of consolidation.       Signed Electronically by: Roxy Guzman NP

## 2025-03-04 NOTE — TELEPHONE ENCOUNTER
Patient was triaged and wants to be seen. Patient was scheduled clinic appointment today with Roxy Guzman. See triage below. Patient has nasal congestion. Feels like congestion in his lungs. . 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 Patient only has shortness of breath with coughing episodes. None with ambulating or sitting. Patient did need to sit up last night to sleep. Feels like he needs to clear his lungs by coughing up unable to get anything up. Denies chest pain. Patient at increased risk for PE due to travel however also has nasal congestion and was feverish and chills. Secure message sent to Roxy Guzman to discuss.    Corin Quintana RN        Reason for Disposition   Patient wants to be seen    Additional Information   Negative: Bluish (or gray) lips or face   Negative: SEVERE difficulty breathing (e.g., struggling for each breath, speaks in single words)   Negative: Rapid onset of cough and has hives   Negative: Coughing started suddenly after medicine, an allergic food or bee sting   Negative: Difficulty breathing after exposure to flames, smoke, or fumes   Negative: Sounds like a life-threatening emergency to the triager   Negative: Previous asthma attacks and this feels like asthma attack   Negative: Dry cough (non-productive; no sputum or minimal clear sputum) and within 14 days of COVID-19 Exposure   Negative: MODERATE difficulty breathing (e.g., speaks in phrases, SOB even at rest, pulse 100-120) and still present when not coughing   Negative: Chest pain present when not coughing   Negative: Passed out (e.g., fainted, lost consciousness, blacked out and was not responding)   Negative: Patient sounds very sick or weak to the triager   Negative: MILD difficulty breathing (e.g., minimal/no SOB at rest, SOB with walking, pulse <100) and still present when not coughing   Negative: Coughed up > 1 tablespoon (15 ml) blood (Exception: Blood-tinged sputum.)   Negative: Fever > 103 F  "(39.4 C)   Negative: Fever > 101 F (38.3 C) and over 60 years of age   Negative: Fever > 100 F (37.8 C) and has diabetes mellitus or a weak immune system (e.g., HIV positive, cancer chemotherapy, organ transplant, splenectomy, chronic steroids)   Negative: Fever > 100 F (37.8 C) and bedridden (e.g., CVA, chronic illness, recovering from surgery)   Negative: Increasing ankle swelling   Negative: Wheezing is present   Negative: SEVERE coughing spells (e.g., whooping sound after coughing, vomiting after coughing)   Negative: Coughing up noel-colored (reddish-brown) or blood-tinged sputum   Negative: Fever present > 3 days (72 hours)   Negative: Fever returns after gone for over 24 hours and symptoms worse or not improved   Negative: Using nasal washes and pain medicine > 24 hours and sinus pain persists   Negative: Known COPD or other severe lung disease (i.e., bronchiectasis, cystic fibrosis, lung surgery) and symptoms getting worse (i.e., increased sputum purulence or amount, increased breathing difficulty)   Negative: Continuous (nonstop) coughing interferes with work or school and no improvement using cough treatment per Care Advice    Answer Assessment - Initial Assessment Questions  1. ONSET: \"When did the cough begin?\"       Reports cough since last Thursday (Feb 27).  2. SEVERITY: \"How bad is the cough today?\"       Moderate. Feels like he needs to cough something up but can not.  3. SPUTUM: \"Describe the color of your sputum\" (e.g., none, dry cough; clear, white, yellow, green)      Nothing out  4. HEMOPTYSIS: \"Are you coughing up any blood?\" If Yes, ask: \"How much?\" (e.g., flecks, streaks, tablespoons, etc.)      nothing  5. DIFFICULTY BREATHING: \"Are you having difficulty breathing?\" If Yes, ask: \"How bad is it?\" (e.g., mild, moderate, severe)       Feels shortness of breath with coughing episodes or when he lays down. Needing to sleep upright. No shortness of breath when sitting or walking.   6. FEVER: \"Do " "you have a fever?\" If Yes, ask: \"What is your temperature, how was it measured, and when did it start?\"      Felt feverish this weekend with chills. Temperature to 99. No chills since the weekend.  7. CARDIAC HISTORY: \"Do you have any history of heart disease?\" (e.g., heart attack, congestive heart failure)       none  8. LUNG HISTORY: \"Do you have any history of lung disease?\"  (e.g., pulmonary embolus, asthma, emphysema)      none  9. PE RISK FACTORS: \"Do you have a history of blood clots?\" (or: recent major surgery, recent prolonged travel, bedridden)      No history of blood clots, major surgery or bedridden. He flies for a living. Came back from [place] to [place] to [place]. Left on Monday and got home on Wednesday.  10. OTHER SYMPTOMS: \"Do you have any other symptoms?\" (e.g., runny nose, wheezing, chest pain)        Nasal congestion. Denies wheezing and chest pain.  11. PREGNANCY: \"Is there any chance you are pregnant?\" \"When was your last menstrual period?\"        na  12. TRAVEL: \"Have you traveled out of the country in the last month?\" (e.g., travel history, exposures)        Yes see above    Protocols used: Cough-A-[place]    "

## 2025-08-21 DIAGNOSIS — E89.0 POSTSURGICAL HYPOTHYROIDISM: ICD-10-CM

## 2025-08-21 DIAGNOSIS — C73 PAPILLARY THYROID CARCINOMA (H): Primary | ICD-10-CM

## (undated) RX ORDER — GLYCOPYRROLATE 0.2 MG/ML
INJECTION, SOLUTION INTRAMUSCULAR; INTRAVENOUS
Status: DISPENSED
Start: 2017-05-16